# Patient Record
Sex: MALE | Race: WHITE | NOT HISPANIC OR LATINO | Employment: FULL TIME | URBAN - METROPOLITAN AREA
[De-identification: names, ages, dates, MRNs, and addresses within clinical notes are randomized per-mention and may not be internally consistent; named-entity substitution may affect disease eponyms.]

---

## 2017-01-04 ENCOUNTER — TELEPHONE (OUTPATIENT)
Dept: INTERNAL MEDICINE | Age: 47
End: 2017-01-04

## 2017-01-26 ENCOUNTER — TELEPHONE (OUTPATIENT)
Dept: INTERNAL MEDICINE | Age: 47
End: 2017-01-26

## 2017-01-26 DIAGNOSIS — F98.8 ADD (ATTENTION DEFICIT DISORDER): ICD-10-CM

## 2017-01-26 RX ORDER — DEXTROAMPHETAMINE SACCHARATE, AMPHETAMINE ASPARTATE MONOHYDRATE, DEXTROAMPHETAMINE SULFATE AND AMPHETAMINE SULFATE 5; 5; 5; 5 MG/1; MG/1; MG/1; MG/1
20 CAPSULE, EXTENDED RELEASE ORAL DAILY
Qty: 30 CAPSULE | Refills: 0 | Status: SHIPPED | OUTPATIENT
Start: 2017-02-27 | End: 2017-03-27 | Stop reason: SDUPTHER

## 2017-01-26 RX ORDER — DEXTROAMPHETAMINE SACCHARATE, AMPHETAMINE ASPARTATE MONOHYDRATE, DEXTROAMPHETAMINE SULFATE AND AMPHETAMINE SULFATE 5; 5; 5; 5 MG/1; MG/1; MG/1; MG/1
20 CAPSULE, EXTENDED RELEASE ORAL DAILY
Qty: 30 CAPSULE | Refills: 0 | Status: SHIPPED | OUTPATIENT
Start: 2017-01-28 | End: 2017-03-27 | Stop reason: SDUPTHER

## 2017-03-27 ENCOUNTER — TELEPHONE (OUTPATIENT)
Dept: INTERNAL MEDICINE | Age: 47
End: 2017-03-27

## 2017-03-27 DIAGNOSIS — F98.8 ADD (ATTENTION DEFICIT DISORDER): ICD-10-CM

## 2017-03-27 RX ORDER — DEXTROAMPHETAMINE SACCHARATE, AMPHETAMINE ASPARTATE MONOHYDRATE, DEXTROAMPHETAMINE SULFATE AND AMPHETAMINE SULFATE 5; 5; 5; 5 MG/1; MG/1; MG/1; MG/1
20 CAPSULE, EXTENDED RELEASE ORAL DAILY
Qty: 30 CAPSULE | Refills: 0 | Status: SHIPPED | OUTPATIENT
Start: 2017-04-28 | End: 2017-06-23 | Stop reason: SDUPTHER

## 2017-03-27 RX ORDER — DEXTROAMPHETAMINE SACCHARATE, AMPHETAMINE ASPARTATE MONOHYDRATE, DEXTROAMPHETAMINE SULFATE AND AMPHETAMINE SULFATE 5; 5; 5; 5 MG/1; MG/1; MG/1; MG/1
20 CAPSULE, EXTENDED RELEASE ORAL DAILY
Qty: 30 CAPSULE | Refills: 0 | Status: SHIPPED | OUTPATIENT
Start: 2017-03-29 | End: 2017-05-13 | Stop reason: SDUPTHER

## 2017-05-10 ENCOUNTER — TELEPHONE (OUTPATIENT)
Dept: INTERNAL MEDICINE | Age: 47
End: 2017-05-10

## 2017-05-10 DIAGNOSIS — Z51.81 MEDICATION MONITORING ENCOUNTER: Primary | ICD-10-CM

## 2017-05-10 DIAGNOSIS — Z13.220 SCREENING FOR LIPOID DISORDERS: ICD-10-CM

## 2017-05-11 ENCOUNTER — LAB SERVICES (OUTPATIENT)
Dept: LAB | Age: 47
End: 2017-05-11

## 2017-05-11 DIAGNOSIS — Z13.220 SCREENING FOR LIPOID DISORDERS: ICD-10-CM

## 2017-05-11 DIAGNOSIS — Z51.81 MEDICATION MONITORING ENCOUNTER: ICD-10-CM

## 2017-05-11 LAB
ALBUMIN SERPL-MCNC: 4.2 G/DL (ref 3.6–5.1)
ALBUMIN/GLOB SERPL: 1.4 {RATIO} (ref 1–2.4)
ALP SERPL-CCNC: 91 UNITS/L (ref 45–117)
ALT SERPL-CCNC: 66 UNITS/L
ANION GAP SERPL CALC-SCNC: 11 MMOL/L (ref 10–20)
AST SERPL-CCNC: 23 UNITS/L
BILIRUB SERPL-MCNC: 0.7 MG/DL (ref 0.2–1)
BUN SERPL-MCNC: 12 MG/DL (ref 6–20)
BUN/CREAT SERPL: 15 (ref 7–25)
CALCIUM SERPL-MCNC: 9.4 MG/DL (ref 8.4–10.2)
CHLORIDE SERPL-SCNC: 105 MMOL/L (ref 98–107)
CHOLEST SERPL-MCNC: 194 MG/DL
CHOLEST/HDLC SERPL: 2.8 {RATIO}
CO2 SERPL-SCNC: 29 MMOL/L (ref 21–32)
CREAT SERPL-MCNC: 0.83 MG/DL (ref 0.67–1.17)
FASTING STATUS PATIENT QL REPORTED: 12 HRS
GLOBULIN SER-MCNC: 2.9 G/DL (ref 2–4)
GLUCOSE SERPL-MCNC: 86 MG/DL (ref 65–99)
HDLC SERPL-MCNC: 70 MG/DL
LDLC SERPL-MCNC: 111 MG/DL
LENGTH OF FAST TIME PATIENT: 12 HRS
NONHDLC SERPL-MCNC: 124 MG/DL
POTASSIUM SERPL-SCNC: 4.4 MMOL/L (ref 3.4–5.1)
PROT SERPL-MCNC: 7.1 G/DL (ref 6.4–8.2)
SODIUM SERPL-SCNC: 141 MMOL/L (ref 135–145)
TRIGL SERPL-MCNC: 65 MG/DL

## 2017-05-11 PROCEDURE — 36415 COLL VENOUS BLD VENIPUNCTURE: CPT | Performed by: INTERNAL MEDICINE

## 2017-05-11 PROCEDURE — 80061 LIPID PANEL: CPT | Performed by: INTERNAL MEDICINE

## 2017-05-11 PROCEDURE — 80053 COMPREHEN METABOLIC PANEL: CPT | Performed by: INTERNAL MEDICINE

## 2017-05-13 ENCOUNTER — OFFICE VISIT (OUTPATIENT)
Dept: INTERNAL MEDICINE | Age: 47
End: 2017-05-13

## 2017-05-13 VITALS
WEIGHT: 236.4 LBS | TEMPERATURE: 98.6 F | RESPIRATION RATE: 12 BRPM | SYSTOLIC BLOOD PRESSURE: 108 MMHG | BODY MASS INDEX: 33.1 KG/M2 | HEART RATE: 64 BPM | HEIGHT: 71 IN | DIASTOLIC BLOOD PRESSURE: 76 MMHG

## 2017-05-13 DIAGNOSIS — Z80.8 FAMILY HISTORY OF SKIN CANCER: ICD-10-CM

## 2017-05-13 DIAGNOSIS — Z00.00 ROUTINE GENERAL MEDICAL EXAMINATION AT A HEALTH CARE FACILITY: Primary | ICD-10-CM

## 2017-05-13 DIAGNOSIS — F98.8 ADD (ATTENTION DEFICIT DISORDER): ICD-10-CM

## 2017-05-13 PROCEDURE — 99396 PREV VISIT EST AGE 40-64: CPT | Performed by: INTERNAL MEDICINE

## 2017-05-13 RX ORDER — DEXTROAMPHETAMINE SACCHARATE, AMPHETAMINE ASPARTATE MONOHYDRATE, DEXTROAMPHETAMINE SULFATE AND AMPHETAMINE SULFATE 5; 5; 5; 5 MG/1; MG/1; MG/1; MG/1
20 CAPSULE, EXTENDED RELEASE ORAL DAILY
Qty: 30 CAPSULE | Refills: 0 | Status: SHIPPED | OUTPATIENT
Start: 2017-05-28 | End: 2017-06-23 | Stop reason: SDUPTHER

## 2017-06-23 ENCOUNTER — TELEPHONE (OUTPATIENT)
Dept: INTERNAL MEDICINE | Age: 47
End: 2017-06-23

## 2017-06-23 DIAGNOSIS — F98.8 ADD (ATTENTION DEFICIT DISORDER): ICD-10-CM

## 2017-06-23 RX ORDER — DEXTROAMPHETAMINE SACCHARATE, AMPHETAMINE ASPARTATE MONOHYDRATE, DEXTROAMPHETAMINE SULFATE AND AMPHETAMINE SULFATE 5; 5; 5; 5 MG/1; MG/1; MG/1; MG/1
20 CAPSULE, EXTENDED RELEASE ORAL DAILY
Qty: 30 CAPSULE | Refills: 0 | Status: SHIPPED | OUTPATIENT
Start: 2017-07-27 | End: 2017-08-23 | Stop reason: SDUPTHER

## 2017-06-23 RX ORDER — DEXTROAMPHETAMINE SACCHARATE, AMPHETAMINE ASPARTATE MONOHYDRATE, DEXTROAMPHETAMINE SULFATE AND AMPHETAMINE SULFATE 5; 5; 5; 5 MG/1; MG/1; MG/1; MG/1
20 CAPSULE, EXTENDED RELEASE ORAL DAILY
Qty: 30 CAPSULE | Refills: 0 | Status: SHIPPED | OUTPATIENT
Start: 2017-06-27 | End: 2017-08-23 | Stop reason: SDUPTHER

## 2017-08-23 ENCOUNTER — TELEPHONE (OUTPATIENT)
Dept: INTERNAL MEDICINE | Age: 47
End: 2017-08-23

## 2017-08-23 DIAGNOSIS — F98.8 ADD (ATTENTION DEFICIT DISORDER): ICD-10-CM

## 2017-08-23 RX ORDER — DEXTROAMPHETAMINE SACCHARATE, AMPHETAMINE ASPARTATE MONOHYDRATE, DEXTROAMPHETAMINE SULFATE AND AMPHETAMINE SULFATE 5; 5; 5; 5 MG/1; MG/1; MG/1; MG/1
20 CAPSULE, EXTENDED RELEASE ORAL DAILY
Qty: 30 CAPSULE | Refills: 0 | Status: SHIPPED | OUTPATIENT
Start: 2017-09-25 | End: 2017-10-24

## 2017-08-23 RX ORDER — DEXTROAMPHETAMINE SACCHARATE, AMPHETAMINE ASPARTATE MONOHYDRATE, DEXTROAMPHETAMINE SULFATE AND AMPHETAMINE SULFATE 5; 5; 5; 5 MG/1; MG/1; MG/1; MG/1
20 CAPSULE, EXTENDED RELEASE ORAL DAILY
Qty: 30 CAPSULE | Refills: 0 | Status: SHIPPED | OUTPATIENT
Start: 2017-08-26 | End: 2017-10-26 | Stop reason: SDUPTHER

## 2017-10-11 ENCOUNTER — OFFICE VISIT (OUTPATIENT)
Dept: DERMATOLOGY | Age: 47
End: 2017-10-11
Attending: INTERNAL MEDICINE

## 2017-10-11 DIAGNOSIS — D18.01 CHERRY ANGIOMA: ICD-10-CM

## 2017-10-11 DIAGNOSIS — L57.8 SUN-DAMAGED SKIN: ICD-10-CM

## 2017-10-11 DIAGNOSIS — L30.9 ECZEMA, UNSPECIFIED TYPE: ICD-10-CM

## 2017-10-11 DIAGNOSIS — D22.9 MULTIPLE BENIGN NEVI: Primary | ICD-10-CM

## 2017-10-11 PROCEDURE — 99203 OFFICE O/P NEW LOW 30 MIN: CPT | Performed by: DERMATOLOGY

## 2017-10-25 DIAGNOSIS — F98.8 ATTENTION DEFICIT DISORDER (ADD) WITHOUT HYPERACTIVITY: ICD-10-CM

## 2017-10-27 ENCOUNTER — TELEPHONE (OUTPATIENT)
Dept: INTERNAL MEDICINE | Age: 47
End: 2017-10-27

## 2017-10-27 RX ORDER — DEXTROAMPHETAMINE SACCHARATE, AMPHETAMINE ASPARTATE MONOHYDRATE, DEXTROAMPHETAMINE SULFATE AND AMPHETAMINE SULFATE 5; 5; 5; 5 MG/1; MG/1; MG/1; MG/1
20 CAPSULE, EXTENDED RELEASE ORAL DAILY
Qty: 30 CAPSULE | Refills: 0 | Status: SHIPPED | OUTPATIENT
Start: 2017-10-27 | End: 2017-11-09 | Stop reason: SDUPTHER

## 2017-11-09 ENCOUNTER — OFFICE VISIT (OUTPATIENT)
Dept: INTERNAL MEDICINE | Age: 47
End: 2017-11-09

## 2017-11-09 VITALS
WEIGHT: 227.5 LBS | BODY MASS INDEX: 31.85 KG/M2 | DIASTOLIC BLOOD PRESSURE: 82 MMHG | HEIGHT: 71 IN | HEART RATE: 76 BPM | RESPIRATION RATE: 12 BRPM | TEMPERATURE: 98.4 F | SYSTOLIC BLOOD PRESSURE: 118 MMHG

## 2017-11-09 DIAGNOSIS — F98.8 ATTENTION DEFICIT DISORDER (ADD) WITHOUT HYPERACTIVITY: ICD-10-CM

## 2017-11-09 PROCEDURE — 99214 OFFICE O/P EST MOD 30 MIN: CPT | Performed by: INTERNAL MEDICINE

## 2017-11-09 RX ORDER — DEXTROAMPHETAMINE SACCHARATE, AMPHETAMINE ASPARTATE MONOHYDRATE, DEXTROAMPHETAMINE SULFATE AND AMPHETAMINE SULFATE 7.5; 7.5; 7.5; 7.5 MG/1; MG/1; MG/1; MG/1
30 CAPSULE, EXTENDED RELEASE ORAL DAILY
Qty: 30 CAPSULE | Refills: 0 | Status: SHIPPED | OUTPATIENT
Start: 2017-11-09 | End: 2017-11-09 | Stop reason: SDUPTHER

## 2017-11-09 RX ORDER — DEXTROAMPHETAMINE SACCHARATE, AMPHETAMINE ASPARTATE MONOHYDRATE, DEXTROAMPHETAMINE SULFATE AND AMPHETAMINE SULFATE 7.5; 7.5; 7.5; 7.5 MG/1; MG/1; MG/1; MG/1
30 CAPSULE, EXTENDED RELEASE ORAL DAILY
Qty: 30 CAPSULE | Refills: 0 | Status: SHIPPED | OUTPATIENT
Start: 2017-12-09 | End: 2018-01-17 | Stop reason: SDUPTHER

## 2017-11-09 ASSESSMENT — PATIENT HEALTH QUESTIONNAIRE - PHQ9
CLINICAL INTERPRETATION OF PHQ2 SCORE: 0
SUM OF ALL RESPONSES TO PHQ9 QUESTIONS 1 AND 2: 0

## 2018-01-16 ENCOUNTER — TELEPHONE (OUTPATIENT)
Dept: INTERNAL MEDICINE | Age: 48
End: 2018-01-16

## 2018-01-16 DIAGNOSIS — F98.8 ATTENTION DEFICIT DISORDER (ADD) WITHOUT HYPERACTIVITY: ICD-10-CM

## 2018-01-17 RX ORDER — DEXTROAMPHETAMINE SACCHARATE, AMPHETAMINE ASPARTATE MONOHYDRATE, DEXTROAMPHETAMINE SULFATE AND AMPHETAMINE SULFATE 7.5; 7.5; 7.5; 7.5 MG/1; MG/1; MG/1; MG/1
30 CAPSULE, EXTENDED RELEASE ORAL DAILY
Qty: 30 CAPSULE | Refills: 0 | Status: SHIPPED | OUTPATIENT
Start: 2018-01-17 | End: 2018-02-22 | Stop reason: SDUPTHER

## 2018-02-22 ENCOUNTER — TELEPHONE (OUTPATIENT)
Dept: INTERNAL MEDICINE | Age: 48
End: 2018-02-22

## 2018-02-22 DIAGNOSIS — F98.8 ATTENTION DEFICIT DISORDER (ADD) WITHOUT HYPERACTIVITY: ICD-10-CM

## 2018-02-22 RX ORDER — DEXTROAMPHETAMINE SACCHARATE, AMPHETAMINE ASPARTATE MONOHYDRATE, DEXTROAMPHETAMINE SULFATE AND AMPHETAMINE SULFATE 7.5; 7.5; 7.5; 7.5 MG/1; MG/1; MG/1; MG/1
30 CAPSULE, EXTENDED RELEASE ORAL DAILY
Qty: 30 CAPSULE | Refills: 0 | Status: SHIPPED | OUTPATIENT
Start: 2018-02-22 | End: 2018-03-21 | Stop reason: SDUPTHER

## 2018-03-21 ENCOUNTER — TELEPHONE (OUTPATIENT)
Dept: INTERNAL MEDICINE | Age: 48
End: 2018-03-21

## 2018-03-21 DIAGNOSIS — F98.8 ATTENTION DEFICIT DISORDER (ADD) WITHOUT HYPERACTIVITY: ICD-10-CM

## 2018-03-21 RX ORDER — DEXTROAMPHETAMINE SACCHARATE, AMPHETAMINE ASPARTATE MONOHYDRATE, DEXTROAMPHETAMINE SULFATE AND AMPHETAMINE SULFATE 7.5; 7.5; 7.5; 7.5 MG/1; MG/1; MG/1; MG/1
30 CAPSULE, EXTENDED RELEASE ORAL DAILY
Qty: 30 CAPSULE | Refills: 0 | Status: SHIPPED | OUTPATIENT
Start: 2018-03-24 | End: 2018-04-23 | Stop reason: SDUPTHER

## 2018-04-23 ENCOUNTER — TELEPHONE (OUTPATIENT)
Dept: INTERNAL MEDICINE | Age: 48
End: 2018-04-23

## 2018-04-23 DIAGNOSIS — F98.8 ATTENTION DEFICIT DISORDER (ADD) WITHOUT HYPERACTIVITY: ICD-10-CM

## 2018-04-23 RX ORDER — DEXTROAMPHETAMINE SACCHARATE, AMPHETAMINE ASPARTATE MONOHYDRATE, DEXTROAMPHETAMINE SULFATE AND AMPHETAMINE SULFATE 7.5; 7.5; 7.5; 7.5 MG/1; MG/1; MG/1; MG/1
30 CAPSULE, EXTENDED RELEASE ORAL DAILY
Qty: 30 CAPSULE | Refills: 0 | Status: SHIPPED | OUTPATIENT
Start: 2018-04-23 | End: 2018-05-24 | Stop reason: SDUPTHER

## 2018-05-14 ENCOUNTER — APPOINTMENT (OUTPATIENT)
Dept: INTERNAL MEDICINE | Age: 48
End: 2018-05-14

## 2018-05-24 ENCOUNTER — TELEPHONE (OUTPATIENT)
Dept: INTERNAL MEDICINE | Age: 48
End: 2018-05-24

## 2018-05-24 DIAGNOSIS — F98.8 ATTENTION DEFICIT DISORDER (ADD) WITHOUT HYPERACTIVITY: ICD-10-CM

## 2018-05-24 RX ORDER — DEXTROAMPHETAMINE SACCHARATE, AMPHETAMINE ASPARTATE MONOHYDRATE, DEXTROAMPHETAMINE SULFATE AND AMPHETAMINE SULFATE 7.5; 7.5; 7.5; 7.5 MG/1; MG/1; MG/1; MG/1
30 CAPSULE, EXTENDED RELEASE ORAL DAILY
Qty: 30 CAPSULE | Refills: 0 | Status: SHIPPED | OUTPATIENT
Start: 2018-05-25 | End: 2018-06-21 | Stop reason: SDUPTHER

## 2018-06-21 ENCOUNTER — TELEPHONE (OUTPATIENT)
Dept: INTERNAL MEDICINE | Age: 48
End: 2018-06-21

## 2018-06-21 DIAGNOSIS — F98.8 ATTENTION DEFICIT DISORDER (ADD) WITHOUT HYPERACTIVITY: ICD-10-CM

## 2018-06-21 RX ORDER — DEXTROAMPHETAMINE SACCHARATE, AMPHETAMINE ASPARTATE MONOHYDRATE, DEXTROAMPHETAMINE SULFATE AND AMPHETAMINE SULFATE 7.5; 7.5; 7.5; 7.5 MG/1; MG/1; MG/1; MG/1
30 CAPSULE, EXTENDED RELEASE ORAL DAILY
Qty: 30 CAPSULE | Refills: 0 | Status: SHIPPED | OUTPATIENT
Start: 2018-06-24 | End: 2018-07-25 | Stop reason: SDUPTHER

## 2018-06-25 ENCOUNTER — TELEPHONE (OUTPATIENT)
Dept: INTERNAL MEDICINE | Age: 48
End: 2018-06-25

## 2018-07-13 ENCOUNTER — OFFICE VISIT (OUTPATIENT)
Dept: INTERNAL MEDICINE | Age: 48
End: 2018-07-13

## 2018-07-13 ENCOUNTER — APPOINTMENT (OUTPATIENT)
Dept: INTERNAL MEDICINE | Age: 48
End: 2018-07-13

## 2018-07-13 VITALS
WEIGHT: 241 LBS | TEMPERATURE: 99.6 F | HEART RATE: 89 BPM | BODY MASS INDEX: 33.74 KG/M2 | RESPIRATION RATE: 16 BRPM | DIASTOLIC BLOOD PRESSURE: 80 MMHG | SYSTOLIC BLOOD PRESSURE: 118 MMHG | HEIGHT: 71 IN

## 2018-07-13 DIAGNOSIS — Z51.81 MEDICATION MONITORING ENCOUNTER: ICD-10-CM

## 2018-07-13 DIAGNOSIS — F98.8 ATTENTION DEFICIT DISORDER (ADD) WITHOUT HYPERACTIVITY: Primary | ICD-10-CM

## 2018-07-13 DIAGNOSIS — Z13.220 SCREENING FOR LIPOID DISORDERS: ICD-10-CM

## 2018-07-13 DIAGNOSIS — Z12.5 SPECIAL SCREENING FOR MALIGNANT NEOPLASM OF PROSTATE: ICD-10-CM

## 2018-07-13 PROCEDURE — 99214 OFFICE O/P EST MOD 30 MIN: CPT | Performed by: INTERNAL MEDICINE

## 2018-07-24 ENCOUNTER — TELEPHONE (OUTPATIENT)
Dept: INTERNAL MEDICINE | Age: 48
End: 2018-07-24

## 2018-07-24 DIAGNOSIS — F98.8 ATTENTION DEFICIT DISORDER (ADD) WITHOUT HYPERACTIVITY: ICD-10-CM

## 2018-07-25 RX ORDER — DEXTROAMPHETAMINE SACCHARATE, AMPHETAMINE ASPARTATE MONOHYDRATE, DEXTROAMPHETAMINE SULFATE AND AMPHETAMINE SULFATE 7.5; 7.5; 7.5; 7.5 MG/1; MG/1; MG/1; MG/1
30 CAPSULE, EXTENDED RELEASE ORAL DAILY
Qty: 30 CAPSULE | Refills: 0 | Status: SHIPPED | OUTPATIENT
Start: 2018-07-25 | End: 2018-08-27 | Stop reason: SDUPTHER

## 2018-08-24 ENCOUNTER — TELEPHONE (OUTPATIENT)
Dept: ORTHOPEDICS | Age: 48
End: 2018-08-24

## 2018-08-24 DIAGNOSIS — F98.8 ATTENTION DEFICIT DISORDER (ADD) WITHOUT HYPERACTIVITY: ICD-10-CM

## 2018-08-27 RX ORDER — DEXTROAMPHETAMINE SACCHARATE, AMPHETAMINE ASPARTATE MONOHYDRATE, DEXTROAMPHETAMINE SULFATE AND AMPHETAMINE SULFATE 7.5; 7.5; 7.5; 7.5 MG/1; MG/1; MG/1; MG/1
30 CAPSULE, EXTENDED RELEASE ORAL DAILY
Qty: 30 CAPSULE | Refills: 0 | Status: SHIPPED | OUTPATIENT
Start: 2018-08-27 | End: 2018-09-25 | Stop reason: SDUPTHER

## 2018-08-27 RX ORDER — DEXTROAMPHETAMINE SACCHARATE, AMPHETAMINE ASPARTATE MONOHYDRATE, DEXTROAMPHETAMINE SULFATE AND AMPHETAMINE SULFATE 7.5; 7.5; 7.5; 7.5 MG/1; MG/1; MG/1; MG/1
30 CAPSULE, EXTENDED RELEASE ORAL DAILY
Qty: 30 CAPSULE | Refills: 0 | Status: SHIPPED | OUTPATIENT
Start: 2018-08-27 | End: 2018-08-27 | Stop reason: SDUPTHER

## 2018-09-25 ENCOUNTER — TELEPHONE (OUTPATIENT)
Dept: INTERNAL MEDICINE | Age: 48
End: 2018-09-25

## 2018-09-25 DIAGNOSIS — F98.8 ATTENTION DEFICIT DISORDER (ADD) WITHOUT HYPERACTIVITY: ICD-10-CM

## 2018-09-25 RX ORDER — DEXTROAMPHETAMINE SACCHARATE, AMPHETAMINE ASPARTATE MONOHYDRATE, DEXTROAMPHETAMINE SULFATE AND AMPHETAMINE SULFATE 7.5; 7.5; 7.5; 7.5 MG/1; MG/1; MG/1; MG/1
30 CAPSULE, EXTENDED RELEASE ORAL DAILY
Qty: 30 CAPSULE | Refills: 0 | Status: SHIPPED | OUTPATIENT
Start: 2018-09-26 | End: 2018-10-26 | Stop reason: SDUPTHER

## 2018-10-26 ENCOUNTER — TELEPHONE (OUTPATIENT)
Dept: INTERNAL MEDICINE | Age: 48
End: 2018-10-26

## 2018-10-26 DIAGNOSIS — F98.8 ATTENTION DEFICIT DISORDER (ADD) WITHOUT HYPERACTIVITY: ICD-10-CM

## 2018-10-26 RX ORDER — DEXTROAMPHETAMINE SACCHARATE, AMPHETAMINE ASPARTATE MONOHYDRATE, DEXTROAMPHETAMINE SULFATE AND AMPHETAMINE SULFATE 7.5; 7.5; 7.5; 7.5 MG/1; MG/1; MG/1; MG/1
30 CAPSULE, EXTENDED RELEASE ORAL DAILY
Qty: 30 CAPSULE | Refills: 0 | Status: SHIPPED | OUTPATIENT
Start: 2018-10-26 | End: 2018-11-23 | Stop reason: SDUPTHER

## 2018-11-23 ENCOUNTER — TELEPHONE (OUTPATIENT)
Dept: INTERNAL MEDICINE | Age: 48
End: 2018-11-23

## 2018-11-23 DIAGNOSIS — F98.8 ATTENTION DEFICIT DISORDER (ADD) WITHOUT HYPERACTIVITY: ICD-10-CM

## 2018-11-23 RX ORDER — DEXTROAMPHETAMINE SACCHARATE, AMPHETAMINE ASPARTATE MONOHYDRATE, DEXTROAMPHETAMINE SULFATE AND AMPHETAMINE SULFATE 7.5; 7.5; 7.5; 7.5 MG/1; MG/1; MG/1; MG/1
30 CAPSULE, EXTENDED RELEASE ORAL DAILY
Qty: 30 CAPSULE | Refills: 0 | Status: SHIPPED | OUTPATIENT
Start: 2018-11-23 | End: 2018-12-20 | Stop reason: SDUPTHER

## 2018-12-20 ENCOUNTER — TELEPHONE (OUTPATIENT)
Dept: INTERNAL MEDICINE | Age: 48
End: 2018-12-20

## 2018-12-20 DIAGNOSIS — F98.8 ATTENTION DEFICIT DISORDER (ADD) WITHOUT HYPERACTIVITY: ICD-10-CM

## 2018-12-20 RX ORDER — DEXTROAMPHETAMINE SACCHARATE, AMPHETAMINE ASPARTATE MONOHYDRATE, DEXTROAMPHETAMINE SULFATE AND AMPHETAMINE SULFATE 7.5; 7.5; 7.5; 7.5 MG/1; MG/1; MG/1; MG/1
30 CAPSULE, EXTENDED RELEASE ORAL DAILY
Qty: 30 CAPSULE | Refills: 0 | Status: SHIPPED | OUTPATIENT
Start: 2018-12-23 | End: 2019-01-17 | Stop reason: SDUPTHER

## 2019-01-08 ENCOUNTER — CLINICAL SUPPORT NO REQUIREMENTS (OUTPATIENT)
Dept: CARDIOLOGY | Facility: CLINIC | Age: 49
End: 2019-01-08

## 2019-01-08 ENCOUNTER — OFFICE VISIT (OUTPATIENT)
Dept: CARDIOLOGY | Facility: CLINIC | Age: 49
End: 2019-01-08

## 2019-01-08 VITALS
HEIGHT: 70 IN | SYSTOLIC BLOOD PRESSURE: 108 MMHG | BODY MASS INDEX: 32.64 KG/M2 | WEIGHT: 228 LBS | DIASTOLIC BLOOD PRESSURE: 74 MMHG

## 2019-01-08 DIAGNOSIS — I49.5 SICK SINUS SYNDROME (HCC): Primary | ICD-10-CM

## 2019-01-08 DIAGNOSIS — Z45.018 PACEMAKER REPROGRAMMING/CHECK: Primary | ICD-10-CM

## 2019-01-08 PROCEDURE — 99202 OFFICE O/P NEW SF 15 MIN: CPT | Performed by: INTERNAL MEDICINE

## 2019-01-08 PROCEDURE — 93000 ELECTROCARDIOGRAM COMPLETE: CPT | Performed by: INTERNAL MEDICINE

## 2019-01-08 PROCEDURE — 93280 PM DEVICE PROGR EVAL DUAL: CPT | Performed by: INTERNAL MEDICINE

## 2019-01-08 RX ORDER — IBUPROFEN 200 MG
TABLET ORAL
COMMUNITY
End: 2023-01-03

## 2019-01-08 NOTE — PROGRESS NOTES
Date of Office Visit: 2019  Encounter Provider: Freedom Meeks MD  Place of Service: [facility] CARDIOLOGY  Patient Name: Kingston Hope  :1970    Chief Complaint   Patient presents with   • Pacemaker Check     New Patient Consult / Palps   :     HPI: Kingston Hope is a 48 y.o. male who presents today for establish pacemaker follow.  Mr. Hope had a pacemaker placed at age 28, in .  The indication for placement was recurrent syncope.  He was told at one time that he had heart block secondary to Lyme disease, but latter testing according to him demonstrated that he had never had Lyme Disease.  Otherwise, he has done well since, with no recurrent syncope.  He had a generator exchange in  in [place].  He has had inconsistent follow-up for his device.   He has no concerns about his pacemaker.  He is not currently enrolled in remote monitoring, but is going to start when he is moved into a new house.           Past Medical History:   Diagnosis Date   • Anxiety    • Exertional chest pain     with radiation to left arm.   • History of non-A, non-B viral hepatitis     during childhood.   • History of Trevor [place] spotted fever     as a child   • Pacemaker     secondary to intermittent heart block/ asystole.   • Palpitations    • Syncope        Past Surgical History:   Procedure Laterality Date   • OTHER SURGICAL HISTORY      left femur fracture   • PACEMAKER IMPLANTATION  10/12/2010    medtronic  model number [id number], serial number [id number]   • TEMPOROMANDIBULAR JOINT SURGERY         Social History     Socioeconomic History   • Marital status:      Spouse name: Not on file   • Number of children: Not on file   • Years of education: Not on file   • Highest education level: Not on file   Social Needs   • Financial resource strain: Not on file   • Food insecurity - worry: Not on file   • Food insecurity - inability: Not on file   • Transportation  "needs - medical: Not on file   • Transportation needs - non-medical: Not on file   Occupational History   • Not on file   Tobacco Use   • Smoking status: Never Smoker   • Smokeless tobacco: Never Used   Substance and Sexual Activity   • Alcohol use: No     Frequency: Never   • Drug use: Not on file   • Sexual activity: Not on file   Other Topics Concern   • Not on file   Social History Narrative   • Not on file       History reviewed. No pertinent family history.    Review of Systems   Constitution: Negative for weakness and malaise/fatigue.   HENT: Negative.    Eyes: Negative.    Cardiovascular: Negative for chest pain, dyspnea on exertion, leg swelling and near-syncope.   Respiratory: Negative for cough and shortness of breath.    Endocrine: Negative.    Hematologic/Lymphatic: Negative.    Skin: Negative.    Musculoskeletal: Negative.    Gastrointestinal: Negative.    Genitourinary: Negative.    Neurological: Negative.    Psychiatric/Behavioral: Negative.    Allergic/Immunologic: Negative.        No Known Allergies      Current Outpatient Medications:   •  ibuprofen (ADVIL,MOTRIN) 200 MG tablet, Take  by mouth., Disp: , Rfl:       Objective:     Vitals:    01/08/19 1105   BP: 108/74   BP Location: Left arm   Patient Position: Sitting   Cuff Size: Large Adult   Weight: 103 kg (228 lb)   Height: 177.8 cm (70\")     Body mass index is 32.71 kg/m².    PHYSICAL EXAM:    Physical Exam   Constitutional: He is oriented to person, place, and time. He appears well-developed.   HENT:   Head: Normocephalic and atraumatic.   Eyes: Conjunctivae are normal. Right eye exhibits no discharge. Left eye exhibits no discharge.   Neck: No JVD present. No tracheal deviation present.   Cardiovascular: Normal rate and normal heart sounds.   Pulmonary/Chest: Effort normal and breath sounds normal. No respiratory distress.   Pacemaker in place on left chest.  Well healed. No evidence of infection.   Abdominal: Soft. He exhibits no " distension.   Musculoskeletal: He exhibits no edema or deformity.   Neurological: He is alert and oriented to person, place, and time. No cranial nerve deficit.   Skin: Skin is warm and dry.   Psychiatric: He has a normal mood and affect. His behavior is normal.   Nursing note and vitals reviewed.          ECG 12 Lead  Date/Time: 1/8/2019 4:54 PM  Performed by: Freedom Meeks MD  Authorized by: Freedom Meeks MD   Comparison: not compared with previous ECG   Rhythm: sinus rhythm  Clinical impression: normal ECG              Assessment:       Diagnosis Plan   1. Pacemaker reprogramming/check            Plan:       Normal function of device with good sensing and thresholds.  Unclear was initial indication was, possibly recurrent vasovagal syncope.  Regardless, no further episodes of syncope since implantation.  I don't think he had Lyme disease base on his description.  He will resume normal pacemaker follow and remote monitoring.     As always, it has been a pleasure to participate in your patient's care.      Sincerely,         Freedom Meeks MD

## 2019-01-17 ENCOUNTER — OFFICE VISIT (OUTPATIENT)
Dept: INTERNAL MEDICINE | Age: 49
End: 2019-01-17

## 2019-01-17 VITALS
WEIGHT: 246.8 LBS | SYSTOLIC BLOOD PRESSURE: 120 MMHG | HEART RATE: 72 BPM | RESPIRATION RATE: 16 BRPM | HEIGHT: 71 IN | TEMPERATURE: 98.6 F | BODY MASS INDEX: 34.55 KG/M2 | DIASTOLIC BLOOD PRESSURE: 82 MMHG

## 2019-01-17 DIAGNOSIS — F98.8 ATTENTION DEFICIT DISORDER (ADD) WITHOUT HYPERACTIVITY: ICD-10-CM

## 2019-01-17 PROCEDURE — 99213 OFFICE O/P EST LOW 20 MIN: CPT | Performed by: INTERNAL MEDICINE

## 2019-01-17 RX ORDER — DEXTROAMPHETAMINE SACCHARATE, AMPHETAMINE ASPARTATE MONOHYDRATE, DEXTROAMPHETAMINE SULFATE AND AMPHETAMINE SULFATE 5; 5; 5; 5 MG/1; MG/1; MG/1; MG/1
20 CAPSULE, EXTENDED RELEASE ORAL DAILY
Qty: 30 CAPSULE | Refills: 0 | Status: SHIPPED | OUTPATIENT
Start: 2019-01-22 | End: 2019-02-19 | Stop reason: SDUPTHER

## 2019-01-17 ASSESSMENT — PATIENT HEALTH QUESTIONNAIRE - PHQ9
SUM OF ALL RESPONSES TO PHQ9 QUESTIONS 1 AND 2: 0
CLINICAL INTERPRETATION OF PHQ2 SCORE: 0

## 2019-01-18 ENCOUNTER — APPOINTMENT (OUTPATIENT)
Dept: INTERNAL MEDICINE | Age: 49
End: 2019-01-18

## 2019-02-18 ENCOUNTER — TELEPHONE (OUTPATIENT)
Dept: INTERNAL MEDICINE | Age: 49
End: 2019-02-18

## 2019-02-18 DIAGNOSIS — F98.8 ATTENTION DEFICIT DISORDER (ADD) WITHOUT HYPERACTIVITY: ICD-10-CM

## 2019-02-19 RX ORDER — DEXTROAMPHETAMINE SACCHARATE, AMPHETAMINE ASPARTATE MONOHYDRATE, DEXTROAMPHETAMINE SULFATE AND AMPHETAMINE SULFATE 5; 5; 5; 5 MG/1; MG/1; MG/1; MG/1
20 CAPSULE, EXTENDED RELEASE ORAL DAILY
Qty: 30 CAPSULE | Refills: 0 | Status: SHIPPED | OUTPATIENT
Start: 2019-02-21 | End: 2019-03-20 | Stop reason: SDUPTHER

## 2019-03-09 ENCOUNTER — APPOINTMENT (OUTPATIENT)
Dept: INTERNAL MEDICINE | Age: 49
End: 2019-03-09

## 2019-03-20 ENCOUNTER — TELEPHONE (OUTPATIENT)
Dept: INTERNAL MEDICINE | Age: 49
End: 2019-03-20

## 2019-03-20 DIAGNOSIS — F98.8 ATTENTION DEFICIT DISORDER (ADD) WITHOUT HYPERACTIVITY: ICD-10-CM

## 2019-03-20 RX ORDER — DEXTROAMPHETAMINE SACCHARATE, AMPHETAMINE ASPARTATE MONOHYDRATE, DEXTROAMPHETAMINE SULFATE AND AMPHETAMINE SULFATE 5; 5; 5; 5 MG/1; MG/1; MG/1; MG/1
20 CAPSULE, EXTENDED RELEASE ORAL DAILY
Qty: 30 CAPSULE | Refills: 0 | Status: SHIPPED | OUTPATIENT
Start: 2019-03-23 | End: 2019-04-22 | Stop reason: SDUPTHER

## 2019-04-18 ENCOUNTER — TELEPHONE (OUTPATIENT)
Dept: INTERNAL MEDICINE | Age: 49
End: 2019-04-18

## 2019-04-18 DIAGNOSIS — F98.8 ATTENTION DEFICIT DISORDER (ADD) WITHOUT HYPERACTIVITY: ICD-10-CM

## 2019-04-22 RX ORDER — DEXTROAMPHETAMINE SACCHARATE, AMPHETAMINE ASPARTATE MONOHYDRATE, DEXTROAMPHETAMINE SULFATE AND AMPHETAMINE SULFATE 5; 5; 5; 5 MG/1; MG/1; MG/1; MG/1
20 CAPSULE, EXTENDED RELEASE ORAL DAILY
Qty: 30 CAPSULE | Refills: 0 | Status: SHIPPED | OUTPATIENT
Start: 2019-04-22 | End: 2019-05-17 | Stop reason: SDUPTHER

## 2019-05-17 ENCOUNTER — TELEPHONE (OUTPATIENT)
Dept: INTERNAL MEDICINE | Age: 49
End: 2019-05-17

## 2019-05-17 DIAGNOSIS — F98.8 ATTENTION DEFICIT DISORDER (ADD) WITHOUT HYPERACTIVITY: ICD-10-CM

## 2019-05-17 RX ORDER — DEXTROAMPHETAMINE SACCHARATE, AMPHETAMINE ASPARTATE MONOHYDRATE, DEXTROAMPHETAMINE SULFATE AND AMPHETAMINE SULFATE 5; 5; 5; 5 MG/1; MG/1; MG/1; MG/1
20 CAPSULE, EXTENDED RELEASE ORAL DAILY
Qty: 30 CAPSULE | Refills: 0 | Status: SHIPPED | OUTPATIENT
Start: 2019-05-22 | End: 2019-06-17 | Stop reason: SDUPTHER

## 2019-06-17 ENCOUNTER — TELEPHONE (OUTPATIENT)
Dept: INTERNAL MEDICINE | Age: 49
End: 2019-06-17

## 2019-06-17 DIAGNOSIS — F98.8 ATTENTION DEFICIT DISORDER (ADD) WITHOUT HYPERACTIVITY: ICD-10-CM

## 2019-06-17 RX ORDER — DEXTROAMPHETAMINE SACCHARATE, AMPHETAMINE ASPARTATE MONOHYDRATE, DEXTROAMPHETAMINE SULFATE AND AMPHETAMINE SULFATE 5; 5; 5; 5 MG/1; MG/1; MG/1; MG/1
20 CAPSULE, EXTENDED RELEASE ORAL DAILY
Qty: 30 CAPSULE | Refills: 0 | Status: SHIPPED | OUTPATIENT
Start: 2019-06-21 | End: 2019-07-24 | Stop reason: SDUPTHER

## 2019-07-16 ENCOUNTER — TELEPHONE (OUTPATIENT)
Dept: INTERNAL MEDICINE | Age: 49
End: 2019-07-16

## 2019-07-24 ENCOUNTER — OFFICE VISIT (OUTPATIENT)
Dept: INTERNAL MEDICINE | Age: 49
End: 2019-07-24

## 2019-07-24 VITALS
HEIGHT: 71 IN | RESPIRATION RATE: 16 BRPM | SYSTOLIC BLOOD PRESSURE: 108 MMHG | HEART RATE: 78 BPM | WEIGHT: 256 LBS | TEMPERATURE: 98.4 F | DIASTOLIC BLOOD PRESSURE: 68 MMHG | BODY MASS INDEX: 35.84 KG/M2

## 2019-07-24 DIAGNOSIS — F98.8 ATTENTION DEFICIT DISORDER (ADD) WITHOUT HYPERACTIVITY: ICD-10-CM

## 2019-07-24 PROCEDURE — 99213 OFFICE O/P EST LOW 20 MIN: CPT | Performed by: INTERNAL MEDICINE

## 2019-07-24 RX ORDER — DEXTROAMPHETAMINE SACCHARATE, AMPHETAMINE ASPARTATE MONOHYDRATE, DEXTROAMPHETAMINE SULFATE AND AMPHETAMINE SULFATE 5; 5; 5; 5 MG/1; MG/1; MG/1; MG/1
20 CAPSULE, EXTENDED RELEASE ORAL DAILY
Qty: 30 CAPSULE | Refills: 0 | Status: SHIPPED | OUTPATIENT
Start: 2019-07-24 | End: 2019-08-22

## 2019-07-24 ASSESSMENT — PATIENT HEALTH QUESTIONNAIRE - PHQ9
2. FEELING DOWN, DEPRESSED OR HOPELESS: NOT AT ALL
SUM OF ALL RESPONSES TO PHQ9 QUESTIONS 1 AND 2: 0
SUM OF ALL RESPONSES TO PHQ9 QUESTIONS 1 AND 2: 0
1. LITTLE INTEREST OR PLEASURE IN DOING THINGS: NOT AT ALL

## 2020-02-25 ENCOUNTER — HOSPITAL ENCOUNTER (OUTPATIENT)
Dept: URGENT CARE | Facility: CLINIC | Age: 50
Discharge: HOME | End: 2020-02-25
Attending: EMERGENCY MEDICINE

## 2020-03-12 ENCOUNTER — OFFICE VISIT (OUTPATIENT)
Dept: CARDIOLOGY | Facility: CLINIC | Age: 50
End: 2020-03-12

## 2020-03-12 ENCOUNTER — CLINICAL SUPPORT NO REQUIREMENTS (OUTPATIENT)
Dept: CARDIOLOGY | Facility: CLINIC | Age: 50
End: 2020-03-12

## 2020-03-12 VITALS
WEIGHT: 232 LBS | SYSTOLIC BLOOD PRESSURE: 98 MMHG | HEIGHT: 70 IN | BODY MASS INDEX: 33.21 KG/M2 | DIASTOLIC BLOOD PRESSURE: 68 MMHG | HEART RATE: 76 BPM

## 2020-03-12 DIAGNOSIS — I49.5 SICK SINUS SYNDROME (HCC): Primary | ICD-10-CM

## 2020-03-12 DIAGNOSIS — R55 SYNCOPE, UNSPECIFIED SYNCOPE TYPE: Primary | ICD-10-CM

## 2020-03-12 PROCEDURE — 93280 PM DEVICE PROGR EVAL DUAL: CPT | Performed by: INTERNAL MEDICINE

## 2020-03-12 PROCEDURE — 99213 OFFICE O/P EST LOW 20 MIN: CPT | Performed by: INTERNAL MEDICINE

## 2020-03-12 PROCEDURE — 93000 ELECTROCARDIOGRAM COMPLETE: CPT | Performed by: INTERNAL MEDICINE

## 2020-03-13 PROBLEM — R55 SYNCOPE: Status: ACTIVE | Noted: 2020-03-13

## 2020-03-13 NOTE — PROGRESS NOTES
Date of Office Visit: 2020  Encounter Provider: Freedom Meeks MD  Place of Service: [facility] CARDIOLOGY  Patient Name: Kingston Hope  :1970    Chief Complaint   Patient presents with   • pacemaker reprogramming check   :     HPI: Kingston Hope is a 49 y.o. male who presents today for follow-up of recurrent syncope.  The patient had a dual-chamber pacemaker placed over 20 years ago for recurrent syncope.  [place]  The patient said he had suffered several episodes of syncope, but has had none since placement of the pacemaker.  His pacing percentage is very low at less than 1%.  The patient reports no concerns, but does report that he had the flu about a month ago.  He reports that he is recovered from this.        Past Medical History:   Diagnosis Date   • Anxiety    • Exertional chest pain     with radiation to left arm.   • History of non-A, non-B viral hepatitis     during childhood.   • History of [place] spotted fever     as a child   • Pacemaker     secondary to intermittent heart block/ asystole.   • Palpitations    • Syncope        Past Surgical History:   Procedure Laterality Date   • OTHER SURGICAL HISTORY      left femur fracture   • PACEMAKER IMPLANTATION  10/12/2010    medtronic  model number [id number], serial number [id number]   • TEMPOROMANDIBULAR JOINT SURGERY         Social History     Socioeconomic History   • Marital status:      Spouse name: Not on file   • Number of children: Not on file   • Years of education: Not on file   • Highest education level: Not on file   Tobacco Use   • Smoking status: Never Smoker   • Smokeless tobacco: Never Used   Substance and Sexual Activity   • Alcohol use: No     Frequency: Never       No family history on file.    Review of Systems   Constitution: Negative.   Cardiovascular: Negative.    Respiratory: Negative.    Gastrointestinal: Negative.        No Known  "Allergies      Current Outpatient Medications:   •  ibuprofen (ADVIL,MOTRIN) 200 MG tablet, Take  by mouth., Disp: , Rfl:       Objective:     Vitals:    03/12/20 1156   BP: 98/68   BP Location: Right arm   Patient Position: Sitting   Cuff Size: Large Adult   Pulse: 76   Weight: 105 kg (232 lb)   Height: 177.8 cm (70\")     Body mass index is 33.29 kg/m².    PHYSICAL EXAM:    Physical Exam   Constitutional: He appears well-developed and well-nourished. No distress.   Cardiovascular: Normal rate and regular rhythm.   Pulmonary/Chest: Effort normal and breath sounds normal. No respiratory distress.   Pacemaker pocket with normal appearance.   Skin: He is not diaphoretic.   Psychiatric: He has a normal mood and affect. His behavior is normal. Thought content normal.           ECG 12 Lead  Date/Time: 3/12/2020 4:51 PM  Performed by: Freedom Meeks MD  Authorized by: Freedom Meeks MD   Comparison: compared with previous ECG from 1/8/2019  Similar to previous ECG  Rhythm: sinus rhythm    Clinical impression: normal ECG              Assessment:       Diagnosis Plan   1. Syncope, unspecified syncope type            Plan:       No episodes of recurrent syncope.  Pacemaker function normal.  Follow-up in 1 year.    As always, it has been a pleasure to participate in your patient's care.      Sincerely,         Freedom Meeks MD  "

## 2020-03-20 ENCOUNTER — HOSPITAL ENCOUNTER (OUTPATIENT)
Dept: URGENT CARE | Facility: CLINIC | Age: 50
Discharge: HOME | End: 2020-03-20

## 2020-03-22 LAB — BACTERIA SPEC AEROBE CULT: NORMAL

## 2020-04-15 ENCOUNTER — TELEMEDICINE CONVERTED (OUTPATIENT)
Dept: FAMILY MEDICINE CLINIC | Facility: CLINIC | Age: 50
End: 2020-04-15
Attending: NURSE PRACTITIONER

## 2020-04-28 ENCOUNTER — OFFICE VISIT CONVERTED (OUTPATIENT)
Dept: ORTHOPEDIC SURGERY | Facility: CLINIC | Age: 50
End: 2020-04-28
Attending: ORTHOPAEDIC SURGERY

## 2020-05-15 ENCOUNTER — TELEMEDICINE CONVERTED (OUTPATIENT)
Dept: FAMILY MEDICINE CLINIC | Facility: CLINIC | Age: 50
End: 2020-05-15
Attending: NURSE PRACTITIONER

## 2020-05-19 ENCOUNTER — HOSPITAL ENCOUNTER (OUTPATIENT)
Dept: LAB | Facility: HOSPITAL | Age: 50
Discharge: HOME | End: 2020-05-19
Attending: NURSE PRACTITIONER

## 2020-05-20 LAB
ALBUMIN SERPL-MCNC: 4.4 G/DL (ref 3.5–5)
ALBUMIN/GLOB SERPL: 1.5 {RATIO} (ref 1.4–2.6)
ALP SERPL-CCNC: 67 U/L (ref 53–128)
ALT SERPL-CCNC: 23 U/L (ref 10–40)
ANION GAP SERPL CALC-SCNC: 20 MMOL/L (ref 8–19)
AST SERPL-CCNC: 24 U/L (ref 15–50)
BASOPHILS # BLD AUTO: 0.05 10*3/UL (ref 0–0.2)
BASOPHILS NFR BLD AUTO: 0.8 % (ref 0–3)
BILIRUB SERPL-MCNC: 0.34 MG/DL (ref 0.2–1.3)
BUN SERPL-MCNC: 15 MG/DL (ref 5–25)
BUN/CREAT SERPL: 16 {RATIO} (ref 6–20)
CALCIUM SERPL-MCNC: 9.1 MG/DL (ref 8.7–10.4)
CHLORIDE SERPL-SCNC: 101 MMOL/L (ref 99–111)
CHOLEST SERPL-MCNC: 204 MG/DL (ref 107–200)
CHOLEST/HDLC SERPL: 5.1 {RATIO} (ref 3–6)
CONV ABS IMM GRAN: 0.01 10*3/UL (ref 0–0.2)
CONV CO2: 21 MMOL/L (ref 22–32)
CONV IMMATURE GRAN: 0.2 % (ref 0–1.8)
CONV TOTAL PROTEIN: 7.4 G/DL (ref 6.3–8.2)
CREAT UR-MCNC: 0.94 MG/DL (ref 0.7–1.2)
DEPRECATED RDW RBC AUTO: 39.4 FL (ref 35.1–43.9)
EOSINOPHIL # BLD AUTO: 0.33 10*3/UL (ref 0–0.7)
EOSINOPHIL # BLD AUTO: 5.6 % (ref 0–7)
ERYTHROCYTE [DISTWIDTH] IN BLOOD BY AUTOMATED COUNT: 11.9 % (ref 11.6–14.4)
GFR SERPLBLD BASED ON 1.73 SQ M-ARVRAT: >60 ML/MIN/{1.73_M2}
GLOBULIN UR ELPH-MCNC: 3 G/DL (ref 2–3.5)
GLUCOSE SERPL-MCNC: 105 MG/DL (ref 70–99)
HCT VFR BLD AUTO: 48.4 % (ref 42–52)
HDLC SERPL-MCNC: 40 MG/DL (ref 40–60)
HGB BLD-MCNC: 16.5 G/DL (ref 14–18)
LDLC SERPL CALC-MCNC: 120 MG/DL (ref 70–100)
LYMPHOCYTES # BLD AUTO: 1.58 10*3/UL (ref 1–5)
LYMPHOCYTES NFR BLD AUTO: 26.8 % (ref 20–45)
MCH RBC QN AUTO: 31.1 PG (ref 27–31)
MCHC RBC AUTO-ENTMCNC: 34.1 G/DL (ref 33–37)
MCV RBC AUTO: 91.3 FL (ref 80–96)
MONOCYTES # BLD AUTO: 0.5 10*3/UL (ref 0.2–1.2)
MONOCYTES NFR BLD AUTO: 8.5 % (ref 3–10)
NEUTROPHILS # BLD AUTO: 3.43 10*3/UL (ref 2–8)
NEUTROPHILS NFR BLD AUTO: 58.1 % (ref 30–85)
NRBC CBCN: 0 % (ref 0–0.7)
OSMOLALITY SERPL CALC.SUM OF ELEC: 285 MOSM/KG (ref 273–304)
PLATELET # BLD AUTO: 141 10*3/UL (ref 130–400)
PMV BLD AUTO: 9.5 FL (ref 9.4–12.4)
POTASSIUM SERPL-SCNC: 4.8 MMOL/L (ref 3.5–5.3)
PSA SERPL-MCNC: 0.47 NG/ML (ref 0–4)
RBC # BLD AUTO: 5.3 10*6/UL (ref 4.7–6.1)
SODIUM SERPL-SCNC: 137 MMOL/L (ref 135–147)
TRIGL SERPL-MCNC: 220 MG/DL (ref 40–150)
TSH SERPL-ACNC: 1.98 M[IU]/L (ref 0.27–4.2)
VLDLC SERPL-MCNC: 44 MG/DL (ref 5–37)
WBC # BLD AUTO: 5.9 10*3/UL (ref 4.8–10.8)

## 2020-06-02 ENCOUNTER — HOSPITAL ENCOUNTER (OUTPATIENT)
Dept: GENERAL RADIOLOGY | Facility: HOSPITAL | Age: 50
Discharge: HOME | End: 2020-06-02
Attending: NURSE PRACTITIONER

## 2020-06-17 ENCOUNTER — OFFICE VISIT CONVERTED (OUTPATIENT)
Dept: NEUROSURGERY | Facility: CLINIC | Age: 50
End: 2020-06-17
Attending: PHYSICIAN ASSISTANT

## 2020-07-09 ENCOUNTER — HOSPITAL ENCOUNTER (OUTPATIENT)
Dept: PHYSICAL THERAPY | Facility: CLINIC | Age: 50
Setting detail: RECURRING SERIES
Discharge: HOME | End: 2020-08-17
Attending: NEUROLOGICAL SURGERY

## 2020-07-23 ENCOUNTER — OFFICE VISIT CONVERTED (OUTPATIENT)
Dept: FAMILY MEDICINE CLINIC | Facility: CLINIC | Age: 50
End: 2020-07-23
Attending: NURSE PRACTITIONER

## 2020-10-29 ENCOUNTER — HOSPITAL ENCOUNTER (OUTPATIENT)
Dept: GENERAL RADIOLOGY | Facility: HOSPITAL | Age: 50
Discharge: HOME | End: 2020-10-29
Attending: NURSE PRACTITIONER

## 2020-10-29 ENCOUNTER — OFFICE VISIT CONVERTED (OUTPATIENT)
Dept: FAMILY MEDICINE CLINIC | Facility: CLINIC | Age: 50
End: 2020-10-29
Attending: NURSE PRACTITIONER

## 2020-10-30 ENCOUNTER — HOSPITAL ENCOUNTER (OUTPATIENT)
Dept: FAMILY MEDICINE CLINIC | Facility: CLINIC | Age: 50
Discharge: HOME | End: 2020-10-30
Attending: NURSE PRACTITIONER

## 2020-11-01 LAB — SARS-COV-2 RNA SPEC QL NAA+PROBE: NOT DETECTED

## 2020-11-16 ENCOUNTER — OFFICE VISIT CONVERTED (OUTPATIENT)
Dept: FAMILY MEDICINE CLINIC | Facility: CLINIC | Age: 50
End: 2020-11-16
Attending: NURSE PRACTITIONER

## 2021-04-20 ENCOUNTER — HOSPITAL ENCOUNTER (OUTPATIENT)
Dept: HOSPITAL 49 - FAS | Age: 51
Discharge: HOME | End: 2021-04-20
Attending: SURGERY
Payer: COMMERCIAL

## 2021-04-20 VITALS — BODY MASS INDEX: 33.21 KG/M2 | WEIGHT: 231.99 LBS | HEIGHT: 70 IN

## 2021-04-20 DIAGNOSIS — Z95.0: ICD-10-CM

## 2021-04-20 DIAGNOSIS — Z20.822: ICD-10-CM

## 2021-04-20 DIAGNOSIS — K20.90: Primary | ICD-10-CM

## 2021-04-20 DIAGNOSIS — K29.50: ICD-10-CM

## 2021-04-20 DIAGNOSIS — K58.9: ICD-10-CM

## 2021-04-20 DIAGNOSIS — G43.909: ICD-10-CM

## 2021-04-20 DIAGNOSIS — F41.9: ICD-10-CM

## 2021-04-20 DIAGNOSIS — Z88.5: ICD-10-CM

## 2021-04-20 DIAGNOSIS — Z98.52: ICD-10-CM

## 2021-04-20 LAB
ALBUMIN SERPL-MCNC: 4.1 G/DL (ref 3.4–5)
ALKALINE PHOSHATASE: 77 U/L (ref 46–116)
ALT SERPL-CCNC: 35 U/L (ref 16–63)
AST: 26 U/L (ref 15–37)
BILIRUBIN - TOTAL: 1.2 MG/DL (ref 0.2–1)
BUN SERPL-MCNC: 9 MG/DL (ref 7–18)
BUN/CREAT RATIO (CALC): 12.2 RATIO
CHLORIDE: 98 MMOL/L (ref 98–107)
CO2 (BICARBONATE): 23 MMOL/L (ref 21–32)
CREATININE: 0.74 MG/DL (ref 0.67–1.17)
GLOBULIN (CALCULATION): 3.3 G/DL
GLUCOSE SERPL-MCNC: 89 MG/DL (ref 74–106)
HCT: 47.1 % (ref 42–52)
HGB BLD-MCNC: 16.9 G/DL (ref 13.2–18)
MCH RBC QN AUTO: 31.5 PG (ref 25–31)
MCHC RBC AUTO-ENTMCNC: 35.9 G/DL (ref 32–36)
MCV: 87.9 FL (ref 78–100)
MPV: 9.1 FL (ref 6–9.5)
PLT: 141 K/UL (ref 150–400)
POTASSIUM: 4.1 MMOL/L (ref 3.5–5.1)
RBC: 5.36 M/UL (ref 4.7–6)
RDW: 11.8 % (ref 11.5–14)
TOTAL PROTEIN: 7.4 G/DL (ref 6.4–8.2)
WBC: 4.5 K/UL (ref 4–10.5)

## 2021-05-10 NOTE — H&P
History and Physical      Patient Name: Kingston Hope   Patient ID: [id number]   Sex: Male   YOB: 1970        Visit Date: June 17, 2020    Provider: Jossie Nelson PA-C   Location: [facility]   Location Address: [address] [place] [address]  [id number]   Location Phone: [phone]          Chief Complaint     Patient is here with complaints of low back pain. MRI at [facility].       History Of Present Illness  The patient is a 49 year old /White male, who presents on referral from REFERRING CARE PROVIDER NAME, for a neurosurgical evaluation of low back pain and right leg pain.   The right leg pain involves the right foot in a nonspecific distribution. The pain developed gradually several years ago. It is 5/10 in severity has an aching, a dull, and a sharp quality and radiates into the right lower leg in a nonspecific distribution. The pain has been waxing and waning in severity. The pain tends to be maximal at no specific time, but waxes and wanes in severity throughout the day. The patient states the pain is aggravated by prolonged standing, walking long distances, lifting, and lying on soft bed. It is alleviated by rest and lying on flat surface, traction, exercises, changing positions.   He denies bowel or bladder dysfunction. The patient has no prior history of neck or back surgery.   RECENT INTERVENTIONS:  He has been previously treated with physical therapy. The physical therapy was partially effective in relieving the pain.   INFORMATION REVIEWED:  The following information was reviewed: radiology reports and images. CT of the lumbar spine revealed degenerative disk disease. The degenerative disc disease is present at L4-5 with foraminal stenosis worse on right.       Past Medical History  Anxiety; Chronic back pain; Diverticulitis; Head injury; Heart Murmur; Hemorrhoids; Hepatitis; Migraine headache; Pacemaker         Past Surgical History  heart surgery;  Pacemaker; Pacemaker.         Medication List  Imitrex 50 mg oral tablet; Lexapro 10 mg oral tablet; Tylenol Arthritis Pain 650 mg oral tablet extended release         Allergy List  NSAIDS       Allergies Reconciled  Family Medical History  Stroke; Family history of brain cancer; Family history of Alzheimer's disease; Family history of lupus erythematosus; Family history of hearing loss         Social History  Alcohol (Never); Alcohol Use (Never); lives with children; lives with spouse; .; Recreational Drug Use (Never); Tobacco (Never); Working         Immunizations  Name Date Admin   Influenza Refused         Review of Systems  · Constitutional  o Denies  o : chills, excessive sweating, fatigue, fever, sycope/passing out, weight gain, weight loss  · Eyes  o Denies  o : changes in vision, blurry vision, double vision  · HENT  o Denies  o : loss of hearing, ringing in the ears, ear aches, sore throat, nasal congestion, sinus pain, nose bleeds, seasonal allergies  · Cardiovascular  o Denies  o : blood clots, swollen legs, anemia, easy burising or bleeding, transfusions  · Respiratory  o Denies  o : shortness of breath, dry cough, productive cough, pneumonia, COPD  · Gastrointestinal  o Denies  o : difficulty swallowing, reflux  · Genitourinary  o Denies  o : incontinence  · Neurologic  o Denies  o : headache, seizure, stroke, tremor, loss of balance, falls, dizziness/vertigo, difficulty with sleep, numbness/tingling/paresthesia , difficulty with coordination, difficulty with dexterity, weakness  · Musculoskeletal  o Admits  o : low back pain  o Denies  o : neck stiffness/pain, swollen lymph nodes, muscle aches, joint pain, weakness, spasms, sciatica, pain radiating in arm, pain radiating in leg  · Endocrine  o Denies  o : diabetes, thyroid disorder  · Psychiatric  o Denies  o : anxiety, depression      Vitals  Date Time BP Position Site L\R Cuff Size HR RR TEMP (F) WT  HT  BMI kg/m2 BSA m2 O2 Sat HC      "  06/17/2020 02:38 PM        96.7 236lbs 0oz 5'  10\" 33.86 2.3           Physical Examination  · Constitutional  o Appearance  o : well-nourished, well developed, alert, in no acute distress  · Respiratory  o Respiratory Effort  o : breathing unlabored  · Cardiovascular  o Peripheral Vascular System  o :   § Extremities  § : no edema or cyanosis  · Musculoskeletal  o Spine  o :   § Inspection/Palpation  § : tenderness to palpation present in lower spine.   o Right Lower Extremity  o :   § Inspection/Palpation  § : no joint or limb tenderness to palpation, no edema present, no ecchymosis  § Joint Stability  § : joint stability within normal limits  § Range of Motion  § : range of motion normal, no joint crepitations present, no pain on motion, Uzair's test negative  o Left Lower Extremity  o :   § Inspection/Palpation  § : no joint or limb tenderness to palpation, no edema present, no ecchymosis  § Joint Stability  § : joint stability within normal limits  § Range of Motion  § : range of motion normal, no joint crepitations present, no pain on motion, Uzair's test negative  · Skin and Subcutaneous Tissue  o Extremities  o :   § Right Lower Extremity  § : no lesions or areas of discoloration  § Left Lower Extremity  § : no lesions or areas of discoloration  o Back  o : no lesions or areas of discoloration  · Neurologic  o Mental Status Examination  o :   § Orientation  § : alert and oriented to time, person, place and events  o Motor Examination  o :   § RLE Strength  § : strength normal  § RLE Motor Function  § : tone normal, no atrophy, no abnormal movements noted  § LLE Strength  § : strength normal  § LLE Motor Function  § : tone normal, no atrophy, no abnormal movements noted  o Reflexes  o :   § RLE  § : knee and ankle reflexes 2/4, SLR negative  § LLE  § : knee and ankle reflexes 2/4, SLR negative  o Sensation  o :   § Light Touch  § : sensation intact to light touch in extremities  o Gait and Station  o : "   § Gait Screening  § : normal gait, able to stand without difficulty  · Psychiatric  o Mood and Affect  o : mood normal, affect appropriate          Assessment  · Lumbago/low back pain     724.3/M54.40  · Lumbar disc herniation, L4-5     722.10/M51.26  · Sciatica     724.3/M54.30      Plan  · Orders  o PHYSICAL THERAPY CONSULTATION (PHYTH) - 724.3/M54.30, 724.3/M54.40, 722.10/M51.26 - 06/17/2020  o XR lumbar spine, 2-3 bending views (12417) - 724.3/M54.30, 724.3/M54.40, 722.10/M51.26 - 06/17/2020  · Medications  o Medications have been Reconciled  o Transition of Care or Provider Policy  · Instructions  o Encouraged to follow-up with Primary Care Provider for preventative care.  o The ROS and the PFSH were reviewed at today's visit.  o I have discussed the risks and benefits of surgery versus physical therapy, epidural steroids, and other conservative forms of treatment.  o Given these options, the patient wishes to exhaust all forms of non-operative management.  o The patient wishes a referral to physical therapy.  o The patient wishes to proceed with epidural steroid injections.  o Handouts provided: Non-Surgical Treatments for Back Pain/Degenerative Disc Disease.  o We have discussed the benefits of core strengthening. Patient will work on improving the core muscle strength with low impact activities such as walking, swimming, Pilates, etc.   o Call or return to office if symptoms worsen or persist.  o Will order bending xray, LESIs, and PT for patient. Will return to us in 8 weeks. Will work on weight loss.   o Electronically Identified Patient Education Materials Provided Electronically  · Disposition  o Call or Return if symptoms worsen or persist.            Electronically Signed by: Jossie Nelson PA-C -Author on June 17, 2020 03:35:09 PM

## 2021-05-10 NOTE — H&P
History and Physical      Patient Name: Kingston Hope   Patient ID: [id number]   Sex: Male   YOB: 1970    Referring Provider: Toan Collado MD    Visit Date: April 28, 2020    Provider: Toan Collado MD   Location: [facility]   Location Address: [address] [place] [address]  [id number]   Location Phone: [phone]          Chief Complaint  · Left Elbow Pain  · Left Thumb Pain      History Of Present Illness  Kingston Hope is a 49 year old /White male who presents today to [facility]. He is here for evaluation of his left thumb. He has been having pain in his thumb that has been going on for the last few months. He said he has a little shoulder pain and a little tennis elbow type symptoms and now it has kind of settled in the base of his thumb.       Past Medical History  Anxiety; Diverticulitis; Head injury; Heart Murmur; Hemorrhoids; Hepatitis; Migraine headache; Pacemaker         Past Surgical History  Pacemaker         Medication List  Imitrex 50 mg oral tablet; Lexapro 10 mg oral tablet; Mobic 15 mg oral tablet         Allergy List  NO KNOWN DRUG ALLERGIES         Family Medical History  Family history of brain cancer; Family history of Alzheimer's disease; Family history of lupus erythematosus; Family history of hearing loss         Social History  Alcohol (Never); Tobacco (Former)         Review of Systems  · Constitutional  o Denies  o : fever, chills, weight loss  · Cardiovascular  o Denies  o : chest pain, shortness of breath  · Gastrointestinal  o Denies  o : liver disease, heartburn, nausea, blood in stools  · Genitourinary  o Denies  o : painful urination, blood in urine  · Integument  o Denies  o : rash, itching  · Neurologic  o Denies  o : headache, weakness, loss of consciousness  · Musculoskeletal  o Denies  o : painful, swollen joints  · Psychiatric  o Denies  o : drug/alcohol addiction, anxiety, depression      Vitals  Date Time BP  "Position Site L\R Cuff Size HR RR TEMP (F) WT  HT  BMI kg/m2 BSA m2 O2 Sat HC       04/28/2020 08:57 AM         226lbs 0oz 5'  10\" 32.43 2.25           Physical Examination  · Constitutional  o Appearance  o : well developed, well-nourished, no obvious deformities present  · Head and Face  o Head  o :   § Inspection  § : normocephalic  o Face  o :   § Inspection  § : no facial lesions  · Eyes  o Conjunctivae  o : conjunctivae normal  o Sclerae  o : sclerae white  · Ears, Nose, Mouth and Throat  o Ears  o :   § External Ears  § : appearance within normal limits  § Hearing  § : intact  o Nose  o :   § External Nose  § : appearance normal  · Neck  o Inspection/Palpation  o : normal appearance  o Range of Motion  o : full range of motion  · Respiratory  o Respiratory Effort  o : breathing unlabored  o Inspection of Chest  o : normal appearance  o Auscultation of Lungs  o : no audible wheezing or rales  · Cardiovascular  o Heart  o : regular rate  · Gastrointestinal  o Abdominal Examination  o : soft and non-tender  · Skin and Subcutaneous Tissue  o General Inspection  o : intact, no rashes  · Psychiatric  o General  o : Alert and oriented x3  o Judgement and Insight  o : judgment and insight intact  o Mood and Affect  o : mood normal, affect appropriate  · Left Hand  o Inspection  o : Tender with palpation along the CMC joint. No real grind test. No real locking or catching. A little bit of tenderness over his A1-Pulley. Sensation intact. Pulse is normal. Good finger range of motion. Good wrist range of motion.           Assessment  · Pain of left upper extremity     729.5/M79.602  · Left elbow pain     719.42/M25.522  · Arthralgia of left hand     719.44/M25.542  · Left CMC osteoarthritis     716.94/M19.049      Plan  · Medications  o Medications have been Reconciled  o Transition of Care or Provider Policy  · Instructions  o Reviewed the patient's Past Medical, Social, and Family history as well as the ROS at today's " visit, no changes.  o Call or return if worsening symptoms.  o This note is transcribed by Sabine Bruce mc  o Going to try a brace, anti-inflammatories and see how he does.             Electronically Signed by: Sabine Bruce, -Author on April 29, 2020 08:39:15 AM  Electronically Co-signed by: Toan Collado MD -Reviewer on April 30, 2020 06:07:02 PM

## 2021-05-12 NOTE — PROGRESS NOTES
Progress Note      Patient Name: Kingston Hope   Patient ID: [id number]   Sex: Male   YOB: 1970        Visit Date: April 15, 2020    Provider: SHELDON Ennis   Location: [facility]   Location Address: [address] [place] [address]  [id number]   Location Phone: [phone]          Chief Complaint  · New Patient/Establish Care      History Of Present Illness  Video Conferencing Visit  Kingston Hope is a 49 year old /White male who is presenting for evaluation via video conferencing. Verbal consent obtained before beginning visit.   The following staff were present during this visit: Amelia Arrieta CMA   Kingston Hope is a 49 year old /White male who presents for evaluation and treatment of:      New Patient/Establish Care  Previous PCP was in [place]. Has been a few yrs    Pt c/o back problems with lots of pain and Tennis elbow.  Back pain has been going on for 4 yrs.  Has gained more weight over the yrs.    Left elbow pain wearing bands.    Chronic ongoing back pain since 2015. Pt does HEP without relief. Pt reports he has failed PT at [facility] for 1.5 month with minimal improvement. Pt has seen chiropractor. pt had injection at pain clinic in [place] without improvement. pt reports radiculopathy of left leg to toes. worse after sleeping. pt has failed NSAID's and Tylenol. pt has not been on gabapentin.    PMHx of Pacemaker for sick sinus syndrome, at age 28.    Pt also c/o migraine headaches, could be part of the back pain. Pt reports h/a daily. Migraines 1-2 every two weeks. pt has vision disturbance. Admits to nausea, no vomiting. A lot of sensitivity.  Seen by [facility]. Helped some.  Having headaches daily, once a week turns into migraine with light sensitivity.  Will take Ibuprofen PRN (Wife hides on him so he doesn't take as much because she says is bad for him.)    pt has tramatic childhood. Pt has seen therapist in his  20's. Pt has strong support syndrome. Strong belief in Ned.     Flu shot-declined (Positive for Flu A this past flu season)  Former Smoker- Started at 19 yrs old, Stopped at 21 yrs old, smoking 1PPD       Past Medical History  Disease Name Date Onset Notes   Anxiety --  --    Diverticulitis --  --    Head injury --  Previous   Heart Murmur --  --    Hemorrhoids --  --    Hepatitis 1975 As a child, was hospitalized   Migraine headache --  --    Pacemaker 04/15/2020 Sees [facility].         Past Surgical History  Procedure Name Date Notes   Pacemaker 1998 Weak Sinus Node         Allergy List  Allergen Name Date Reaction Notes   NO KNOWN DRUG ALLERGIES --  --  --          Family Medical History  Disease Name Relative/Age Notes   Family history of brain cancer  --    Family history of Alzheimer's disease  --    Family history of lupus erythematosus  --    Family history of hearing loss  --          Social History  Finding Status Start/Stop Quantity Notes   Alcohol Never --/-- --  --    Tobacco Former 19/21 1PPD --          Review of Systems  · Constitutional  o Denies  o : fatigue, fever, weight gain, weight loss, chills  · HENT  o Denies  o : ear pain, sore throat  · Cardiovascular  o Denies  o : chest Pain, palpitations, edema (swelling)  · Respiratory  o Denies  o : frequent cough, shortness of breath  · Gastrointestinal  o Denies  o : nausea, vomiting, changes in bowel habits  · Genitourinary  o Denies  o : dysuria, urinary frequency, urinary urgency, polyuria  · Neurologic  o Admits  o : tingling or numbness  o Denies  o : headache, dizziness  · Musculoskeletal  o Admits  o : back pain, muscle weakness  o Denies  o : joint pain, myalgias  · Endocrine  o Denies  o : polydipsia, polyphagia  · Psychiatric  o Admits  o : anxiety  o Denies  o : mood changes, memory changes, SI/HI  · Allergic-Immunologic  o Denies  o : eczema, seasonal allergies, urticaria      Physical  Examination  · Constitutional  o Appearance  o : NAD, alert and oriented, pleasant  · Skin and Subcutaneous Tissue  o General Inspection  o : Color normal, no rash, good turgor  · Neurologic  o Mental Status Examination  o :   § Orientation  § : grossly oriented to person, place and time     pt positive for phalens on left, negative for tinnels.           Assessment  · Anxiety disorder     300.00/F41.9  · Chronic left-sided low back pain with left-sided sciatica       Lumbago with sciatica, left side     724.2/M54.42  Other chronic pain     724.2/G89.29  · Back pain     724.5/M54.9  · Migraine     346.90/G43.909  · Establishing care with new doctor, encounter for     V65.8/Z76.89  · Migraine headache     346.90/G43.909  · Pacemaker     V45.01/Z95.0  Sees [facility].  · Elbow pain, left     719.42/M25.522  · Weakness of left lower extremity     729.89/R29.898      Plan  · Orders  o Male Physical Primary Care Panel (CMP, CBC, TSH, Lipid, PSA) [facility] (70582, 00689, 82122, 64035, 90415, ) - - 04/15/2020  o ACO-39: Current medications updated and reviewed () - - 04/15/2020  o ACO-14: Influenza immunization was not administered for reasons documented () - - 04/15/2020  o ACO - Pt declines to or was not able to provide an Advance Care Plan or name a Surrogate Decision Maker (1124F) - - 04/15/2020  o ORTHOPEDIC CONSULTATION (ORTHO) - 719.42/M25.522 - 04/15/2020  o CT Lumbar Spine without IV Contrast [facility] (54521) - 724.5/M54.9, 724.2/M54.42, 724.2/G89.29, 729.89/R29.898 - 04/15/2020  · Medications  o Imitrex 50 mg oral tablet   SIG: take 1 tab with fluids at onset of a migraine attack;may repeat after 2 hours if headache returns, not to exceed 200mg in 24hrs.   DISP: (14) tablets with 0 refills  Prescribed on 04/15/2020     o Lexapro 10 mg oral tablet   SIG: take 1 tablet by oral route once a day (at bedtime) for 30 days   DISP: (30) tablets with 1 refills  Prescribed on 04/15/2020      · Instructions  o Patient was given an SSRI/SSNRI medication and warned of possible side effects of the medication including potential for increased risk of suicidal thoughts and feelings. Patient was instructed that if they begin to exhibit any of these effects they will discontinue the medication immediately and contact our office or the ER ASAP.  o Patient was educated/instructed on their diagnosis, treatment and medications prior to discharge from the clinic today.  o Call the office with any concerns or questions.  o Flu vaccine declined.  · Disposition  o Return to Office in 1 month.            Electronically Signed by: SHELDON Ennis -Author on April 15, 2020 10:20:15 AM

## 2021-05-13 NOTE — PROGRESS NOTES
Progress Note      Patient Name: Kingston Hope   Patient ID: [id number]   Sex: Male   YOB: 1970        Visit Date: May 15, 2020    Provider: SHELDON Ennis   Location: [facility]   Location Address: [address] [place] [address]  [id number]   Location Phone: [phone]          Chief Complaint  · 1 Month Follow up for Anxiety and Migraine Headaches      History Of Present Illness  Video Conferencing Visit  Kingston Hope is a 49 year old /White male who is presenting for evaluation via video conferencing. Verbal consent obtained before beginning visit.   The following staff were present during this visit: Amelia Arrieta CMA   Kingston Hope is a 49 year old /White male who presents for evaluation and treatment of:      1 Month Follow up for Anxiety and Migraine Headaches  Taking Lexapro for Anxiety, pt reported doing well on the medication.  Taking Imitrex for Migraine headaches, pt said he has not needed to take it yet.    Lab work ordered on 4/15/20, due.    CT Lumbar Spine scheduled 4/23/20. Pt had rescheduled due to his daughter had his first grandchild.  Needs to be rescheduled.    Ortho consult was on 4/28/20 with Dr. Collado for elbow pain and Lt hand pain. office note in [facility].  Dx'd with Arthritis given Mobic to try. Pt said he took for 4 days then started breaking out, so stopped taking.   Using wrist brace currently . Pain level has improved.       Past Medical History  Disease Name Date Onset Notes   Anxiety --  --    Chronic back pain --  --    Diverticulitis --  --    Head injury --  Previous   Heart Murmur --  --    Hemorrhoids --  --    Hepatitis --  As a child, was hospitalized   Migraine headache --  --    Pacemaker 04/15/2020 Sees [facility] cardiology.         Past Surgical History  Procedure Name Date Notes   heart surgery --  --    Pacemaker 1998 Weak Sinus Node   Pacemaker. --  --          Medication List  Name Date  Started Instructions   Imitrex 50 mg oral tablet 04/15/2020 take 1 tab with fluids at onset of a migraine attack;may repeat after 2 hours if headache returns, not to exceed 200mg in 24hrs.   Lexapro 10 mg oral tablet 04/15/2020 take 1 tablet by oral route once a day (at bedtime) for 30 days         Allergy List  Allergen Name Date Reaction Notes   NO KNOWN DRUG ALLERGIES --  --  --    NO KNOWN DRUG ALLERGIES --  --  --          Family Medical History  Disease Name Relative/Age Notes   Stroke Mother/   Mother   Family history of brain cancer  --    Family history of Alzheimer's disease  --    Family history of lupus erythematosus  --    Family history of hearing loss  --          Social History  Finding Status Start/Stop Quantity Notes   Alcohol Never --/-- --  --    Alcohol Use Never --/-- --  does not drink   lives with children --  --/-- --  --    lives with spouse --  --/-- --  --    . --  --/-- --  --    Recreational Drug Use Never --/-- --  no   Tobacco Never --/-- --  never smoker   Working --  --/-- --  --          Immunizations  NameDate Admin Mfg Trade Name Lot Number Route Inj VIS Given VIS Publication   InfluenzaRefused 04/15/2020 NE Not Entered  NE NE     Comments: Pt declined         Review of Systems  · Constitutional  o Denies  o : fatigue, fever, weight gain, weight loss, chills  · Cardiovascular  o Denies  o : chest Pain, palpitations, edema (swelling)  · Respiratory  o Denies  o : frequent cough, shortness of breath  · Gastrointestinal  o Denies  o : nausea, vomiting, changes in bowel habits  · Genitourinary  o Denies  o : dysuria, urinary frequency, urinary urgency, polyuria  · Neurologic  o Denies  o : headache, tingling or numbness, dizziness  · Musculoskeletal  o Admits  o : elbow pain, hand pain  o Denies  o : joint pain, myalgias  · Endocrine  o Denies  o : polydipsia, polyphagia  · Psychiatric  o Admits  o : anxiety  o Denies  o : mood changes, memory changes, SI/HI      Vitals  Date  "Time BP Position Site L\R Cuff Size HR RR TEMP (F) WT  HT  BMI kg/m2 BSA m2 O2 Sat        04/28/2020 08:57 AM         226lbs 0oz 5'  10\" 32.43 2.25           Physical Examination  · Constitutional  o Appearance  o : NAD, alert and oriented, pleasant  · Respiratory  o Respiratory Effort  o : breathing unlabored, no accessory muscle use  · Skin and Subcutaneous Tissue  o General Inspection  o : Color normal, no rash, good turgor  · Neurologic  o Mental Status Examination  o :   § Orientation  § : grossly oriented to person, place and time          Assessment  · Anxiety disorder     300.00/F41.9  · Lumbago     724.2/M54.5  will reschedule CT  · Migraine headache     346.90/G43.909  · Elbow pain, left     719.42/M25.522  · Wrist pain     719.43/M25.539  · Arthritis     716.90/M19.90    Problems Reconciled  Plan  · Orders  o ACO-39: Current medications updated and reviewed () - - 05/15/2020  · Medications  o Tylenol Arthritis Pain 650 mg oral tablet extended release   SIG: take 2 tablets (1,300 mg) by oral route every 8 hours swallowing whole with water. Do not break, crush, dissolve and/or chew. for 30 days   DISP: (180) tablets with 0 refills  Prescribed on 05/15/2020     o Lexapro 10 mg oral tablet   SIG: take 1 tablet by oral route once a day (at bedtime) for 90 days   DISP: (90) tablets with 1 refills  Adjusted on 05/15/2020     o Medications have been Reconciled  o Transition of Care or Provider Policy  · Instructions  o Patient was educated/instructed on their diagnosis, treatment and medications prior to discharge from the clinic today.  · Disposition  o Return to Office in 6 months.            Electronically Signed by: SHELDON Ennis -Author on May 15, 2020 09:58:32 AM  "

## 2021-05-13 NOTE — PROGRESS NOTES
"   Progress Note      Patient Name: Kingston Hope   Patient ID: [id number]   Sex: Male   YOB: 1970        Visit Date: July 23, 2020    Provider: SHELDON Ennis   Location: [facility]   Location Address: [address] [place] [address]  [id number]   Location Phone: [phone]          Chief Complaint  · Back Pain      History Of Present Illness  Kingston Hope is a 49 year old /White male who presents for evaluation and treatment of:      Pt c/o back pain, had first injection at [facility] on 07/06/20 (last office visit). Pt c/o pain being worse since then. Pt is afraid to go back for second injection. Lumbago improved. Pt reports physical therapy was bad and did not help. Pain worse at night when trying to sleep.    Pt also c/o chest pain/shortness of breath since February when he had the flu. Coughing off and on. non productive. worse with mowing and the trees were blooming.     Went to [facility] in early march. Never tested for COVID. No Family has had COVID.    Anxiety - pt has been to therapy previously. pt reports Lexapro has been helping him.    IBS - ongoing for \"months\". pt had Constipation then since Lexapro he has been more regular. Pt has made diet modifications and cut back on sugar.     Weight loss of 4 lbs with diet modifications.       Past Medical History  Disease Name Date Onset Notes   Anxiety --  --    Chronic back pain --  --    Diverticulitis --  --    Head injury --  Previous   Heart Murmur --  --    Hemorrhoids --  --    Hepatitis --  As a child, was hospitalized   Migraine headache --  --    Pacemaker 04/15/2020 Sees [facility] cardiology.         Past Surgical History  Procedure Name Date Notes   heart surgery --  --    Pacemaker 1998 Weak Sinus Node   Pacemaker. --  --          Medication List  Name Date Started Instructions   Imitrex 50 mg oral tablet 04/15/2020 take 1 tab with fluids at onset of a migraine attack;may " repeat after 2 hours if headache returns, not to exceed 200mg in 24hrs.   Lexapro 10 mg oral tablet 05/15/2020 take 1 tablet by oral route once a day (at bedtime) for 90 days   Tylenol Arthritis Pain 650 mg oral tablet extended release 05/15/2020 take 2 tablets (1,300 mg) by oral route every 8 hours swallowing whole with water. Do not break, crush, dissolve and/or chew. for 30 days         Allergy List  Allergen Name Date Reaction Notes   NSAIDS --  --  --          Family Medical History  Disease Name Relative/Age Notes   Stroke Mother/   Mother   Family history of brain cancer  --    Family history of Alzheimer's disease  --    Family history of lupus erythematosus  --    Family history of hearing loss  --          Social History  Finding Status Start/Stop Quantity Notes   Alcohol Never --/-- --  --    Alcohol Use Never --/-- --  does not drink   lives with children --  --/-- --  --    lives with spouse --  --/-- --  --    . --  --/-- --  --    Recreational Drug Use Never --/-- --  no   Tobacco Never --/-- --  never smoker   Working --  --/-- --  --          Immunizations  NameDate Admin Mfg Trade Name Lot Number Route Inj VIS Given VIS Publication   InfluenzaRefused 04/15/2020 NE Not Entered  NE NE     Comments: Pt declined         Review of Systems  · Constitutional  o Denies  o : fatigue, fever, weight gain, weight loss, chills  · HENT  o Admits  o : ear pain, postnasal drainage  o Denies  o : sore throat  · Cardiovascular  o Denies  o : chest Pain, palpitations, edema (swelling)  · Respiratory  o Admits  o : frequent cough, shortness of breath, pleuritic pain  · Gastrointestinal  o Denies  o : nausea, vomiting, changes in bowel habits  · Genitourinary  o Denies  o : dysuria, urinary frequency, urinary urgency, polyuria  · Neurologic  o Denies  o : headache, tingling or numbness, dizziness  · Musculoskeletal  o Admits  o : back pain  o Denies  o : joint pain, myalgias  · Psychiatric  o Admits  o :  "anxiety, sleep distrubances  o Denies  o : difficulty sleeping, mood changes, memory changes, SI/HI  · Allergic-Immunologic  o Admits  o : seasonal allergies  o Denies  o : eczema, urticaria      Vitals  Date Time BP Position Site L\R Cuff Size HR RR TEMP (F) WT  HT  BMI kg/m2 BSA m2 O2 Sat        07/23/2020 08:28 /71 Sitting    79 - R 18 97.9 231lbs 6oz 5'  10\" 33.2 2.28 97 %          Physical Examination  · Constitutional  o Appearance  o : well-nourished, in no acute distress  · Eyes  o Conjunctivae  o : conjunctivae normal  o Sclerae  o : sclerae white  o Pupils and Irises  o : pupils equal and round  o Eyelids/Ocular Adnexae  o : eyelid appearance normal, no exudates present  · Ears, Nose, Mouth and Throat  o Ears  o :   § External Ears  § : external auditory canal appearance within normal limits, no discharge present  § Otoscopic Examination  § : fluid bubbles present behind tympanic membrane   o Nose  o :   § External Nose  § : appearance normal  § Intranasal Exam  § : mucosa within normal limits, vestibules normal, no intranasal lesions present, septum midline, sinuses non tender to palpation  § Nasopharynx  § : no discharge present  o Oral Cavity  o :   § Oral Mucosa  § : oral mucosa normal  § Lips  § : lip appearance normal  § Teeth  § : normal dentation for age  o Throat  o :   § Oropharynx  § : mild oropharynx inflammation present, tonsils within normal limits  · Respiratory  o Respiratory Effort  o : breathing unlabored  o Inspection of Chest  o : normal appearance  o Auscultation of Lungs  o : normal breath sounds throughout inspiration and expiration  · Cardiovascular  o Heart  o :   § Auscultation of Heart  § : regular rate and rhythm, no murmurs, gallops or rubs  o Peripheral Vascular System  o :   § Carotid Arteries  § : normal pulses bilaterally, no bruits present  § Extremities  § : no clubbing or edema  · Gastrointestinal  o Abdominal Examination  o : abdomen nontender to palpation, tone " normal without rigidity or guarding, no masses present, bowel sounds present  · Musculoskeletal  o Spine  o :   § Inspection/Palpation  § : tenderness to palpation present   § Range of Motion  § : spine range of motion normal  o Right Lower Extremity  o :   § Inspection/Palpation  § : no joint or limb tenderness to palpation, ROM normal  o Left Lower Extremity  o :   § Inspection/Palpation  § : no joint or limb tenderness to palpation, ROM normal  · Skin and Subcutaneous Tissue  o General Inspection  o : no rashes or lesions present, no areas of discoloration  o Body Hair  o : hair normal for age, general body hair distribution normal for age  o Digits and Nails  o : no clubbing, cyanosis, deformities or edema present, normal appearing nails  · Neurologic  o Mental Status Examination  o :   § Orientation  § : grossly oriented to person, place and time  o Gait and Station  o : normal gait, able to stand without difficulty  · Psychiatric  o Judgement and Insight  o : judgment and insight intact  o Mood and Affect  o : mood normal, affect appropriate          Assessment  · Screening for depression     V79.0/Z13.89  · Allergic rhinitis due to allergen     477.9/J30.9  · Anxiety disorder     300.00/F41.9  · Back pain     724.5/M54.9  · Shortness of breath     786.05/R06.02  · BMI 33.0-33.9,adult     V85.33/Z68.33  · IBS (irritable bowel syndrome)     564.1/K58.9      Plan  · Orders  o ACO-39: Current medications updated and reviewed () - - 07/23/2020  o ACO-18: Positive screen for clinical depression using a standardized tool and a follow-up plan documented () - - 07/23/2020  · Medications  o compound cream   SIG: apply nightly   DISP: (90) grams with 0 refills  Prescribed on 07/23/2020     o fluticasone propionate 50 mcg/actuation nasal spray,suspension   SIG: spray 1 - 2 sprays in each nostril by intranasal route once daily   DISP: (1) 15.8 ml aer w/adap with 2 refills  Prescribed on 07/23/2020      o cetirizine 10 mg oral tablet   SIG: take 1 tablet by oral route once a day (at bedtime)   DISP: (30) tablets with 2 refills  Prescribed on 07/23/2020     · Instructions  o Depression Screen completed and scanned into the EMR under the designated folder within the patient's documents.  o Today's PHQ-9 result is _6__  o Patient was given an SSRI/SSNRI medication and warned of possible side effects of the medication including potential for increased risk of suicidal thoughts and feelings. Patient was instructed that if they begin to exhibit any of these effects they will discontinue the medication immediately and contact our office or the ER ASAP.  o Patient was educated/instructed on their diagnosis, treatment and medications prior to discharge from the clinic today.  o Call the office with any concerns or questions.  · Disposition  o FOLLOW UP PRN            Electronically Signed by: SHELDON Ennis -Author on July 23, 2020 01:28:53 PM

## 2021-05-13 NOTE — PROGRESS NOTES
Progress Note      Patient Name: Kingston Hope   Patient ID: [id number]   Sex: Male   YOB: 1970        Visit Date: October 29, 2020    Provider: SHELDON Ennis   Location: [facility]   Location Address: [address] [place] [address]  [id number]   Location Phone: [phone]          Chief Complaint  · Acute Visit for Cough      History Of Present Illness  Kingston Hope is a 50 year old /White male who presents for evaluation and treatment of:      Acute Visit for Cough. ongoing for 5 days.  Pt denies having any fever, loss of taste, or muscle aches.  Having some sorthroat due to cough which is non-productive, some sinus drainage, and shortness of breath.    Took Nyquil Nite Time last night.  Pt said he is not taking Cetirizine due to caused him to be too dryed out.   Has not been using Flonase nasal spray daily.    Pt requested med refill of Lexapro.       Past Medical History  Disease Name Date Onset Notes   Anxiety --  --    Chronic back pain --  --    Diverticulitis --  --    Head injury --  Previous   Heart Murmur --  --    Hemorrhoids --  --    Hepatitis --  As a child, was hospitalized   Migraine headache --  --    Pacemaker 04/15/2020 Sees [facility] cardiology.         Past Surgical History  Procedure Name Date Notes   heart surgery --  --    Pacemaker 1998 Weak Sinus Node   Pacemaker. --  --          Medication List  Name Date Started Instructions   cetirizine 10 mg oral tablet 07/23/2020 take 1 tablet by oral route once a day (at bedtime)   compound cream 07/23/2020 apply nightly   fluticasone propionate 50 mcg/actuation nasal spray,suspension 07/23/2020 spray 1 - 2 sprays in each nostril by intranasal route once daily   Imitrex 50 mg oral tablet 04/15/2020 take 1 tab with fluids at onset of a migraine attack;may repeat after 2 hours if headache returns, not to exceed 200mg in 24hrs.   Lexapro 10 mg oral tablet 05/15/2020  take 1 tablet by oral route once a day (at bedtime) for 90 days   Tylenol Arthritis Pain 650 mg oral tablet extended release 05/15/2020 take 2 tablets (1,300 mg) by oral route every 8 hours swallowing whole with water. Do not break, crush, dissolve and/or chew. for 30 days         Allergy List  Allergen Name Date Reaction Notes   NSAIDS --  --  --          Family Medical History  Disease Name Relative/Age Notes   Family history of brain cancer  --    Family history of stroke Mother/   --    Family history of Alzheimer's disease  --    Family history of lupus erythematosus  --    Family history of hearing loss  --          Social History  Finding Status Start/Stop Quantity Notes   Alcohol Never --/-- --  --    Alcohol Use Never --/-- --  does not drink   lives with children --  --/-- --  --    lives with spouse --  --/-- --  --    . --  --/-- --  --    Recreational Drug Use Never --/-- --  no   Tobacco Never --/-- --  never smoker   Working --  --/-- --  --          Immunizations  NameDate Admin Mfg Trade Name Lot Number Route Inj VIS Given VIS Publication   InfluenzaRefused 04/15/2020 NE Not Entered  NE NE     Comments: Pt declined         Review of Systems  · Constitutional  o Denies  o : fatigue, fever, weight gain, weight loss, chills  · HENT  o Admits  o : postnasal drainage  o Denies  o : ear pain, sore throat  · Cardiovascular  o Denies  o : chest Pain, palpitations, edema (swelling)  · Respiratory  o Admits  o : frequent cough, shortness of breath  · Gastrointestinal  o Denies  o : nausea, vomiting, changes in bowel habits  · Genitourinary  o Denies  o : dysuria, urinary frequency, urinary urgency, polyuria  · Neurologic  o Denies  o : headache, tingling or numbness, dizziness  · Musculoskeletal  o Denies  o : joint pain, myalgias  · Endocrine  o Denies  o : polydipsia, polyphagia  · Psychiatric  o Admits  o : anxiety  o Denies  o : mood changes, memory changes,  "SI/HI  · Allergic-Immunologic  o Denies  o : eczema, seasonal allergies, urticaria      Vitals  Date Time BP Position Site L\R Cuff Size HR RR TEMP (F) WT  HT  BMI kg/m2 BSA m2 O2 Sat FR L/min FiO2 HC       07/23/2020 08:28 /71 Sitting    79 - R 18 97.9 231lbs 6oz 5'  10\" 33.2 2.28 97 %      10/29/2020 01:50 /84 Sitting    83 - R 24 98.7 235lbs 8oz 5'  10\" 33.79 2.3 94 %            Physical Examination  · Constitutional  o Appearance  o : well-nourished, well developed, noted to be uncomfortable    · Eyes  o Conjunctivae  o : conjunctivae normal  o Sclerae  o : sclerae white  o Pupils and Irises  o : pupils equal and round  o Eyelids/Ocular Adnexae  o : eyelid appearance normal, no exudates present  · Ears, Nose, Mouth and Throat  o Ears  o :   § External Ears  § : external auditory canal appearance within normal limits, no discharge present  § Otoscopic Examination  § : tympanic membrane appearance within normal limits bilaterally, cerumen not present  o Nose  o :   § External Nose  § : appearance normal  § Intranasal Exam  § : mucosa within normal limits, vestibules normal, no intranasal lesions present, septum midline, sinuses non tender to palpation  § Nasopharynx  § : no discharge present  o Oral Cavity  o :   § Oral Mucosa  § : oral mucosa normal  § Lips  § : lip appearance normal  § Teeth  § : normal dentation for age  o Throat  o :   § Oropharynx  § : oropharynx inflammation present, tonsils within normal limits  · Neck  o Inspection/Palpation  o : normal appearance, no masses or tenderness, trachea midline  o Range of Motion  o : cervical range of motion within normal limits  o Thyroid  o : gland size normal, nontender, no nodules or masses present on palpation  · Respiratory  o Respiratory Effort  o : breathing unlabored  o Inspection of Chest  o : normal appearance  o Auscultation of Lungs  o : moderate rales present-right-middle lobe left-upper lobe   · Cardiovascular  o Heart  o : "   § Auscultation of Heart  § : regular rate and rhythm, no murmurs, gallops or rubs  o Peripheral Vascular System  o :   § Carotid Arteries  § : normal pulses bilaterally, no bruits present  · Gastrointestinal  o Abdominal Examination  o : abdomen nontender to palpation, tone normal without rigidity or guarding, no masses present, bowel sounds present  · Skin and Subcutaneous Tissue  o General Inspection  o : no rashes or lesions present, no areas of discoloration  o Body Hair  o : hair normal for age, general body hair distribution normal for age  o Digits and Nails  o : no clubbing, cyanosis, deformities or edema present, normal appearing nails  · Neurologic  o Mental Status Examination  o :   § Orientation  § : grossly oriented to person, place and time  o Gait and Station  o : normal gait, able to stand without difficulty  · Psychiatric  o Judgement and Insight  o : judgment and insight intact  o Mood and Affect  o : mood normal, affect appropriate          Assessment  · Cough     786.2/R05  · Pneumonia     486/J18.9  · Shortness of breath     786.05/R06.02  · History of cardiac pacemaker       Presence of cardiac pacemaker     V12.50/Z95.0  · COVID-19 virus infection       COVID-19     519.8/U07.1      Plan  · Orders  o Chest xray 2 views [facility] ([id number]) - 786.2/R05 - 10/29/2020  o ACO-39: Current medications updated and reviewed (, 1159F) - - 10/29/2020  o [facility] NCOV2 (send-out) (11094) - - 10/29/2020  · Medications  o Zithromax Z-Saleem 250 mg oral tablet   SIG: take 2 tablets (500 mg) by oral route once daily for 1 day then 1 tablet (250 mg) by oral route once daily for 4 days   DISP: (6) Tablet with 0 refills  Prescribed on 10/29/2020     o Medrol (Saleem) 4 mg oral tablets,dose pack   SIG: take as directed for 6 days   DISP: (1) Package with 0 refills  Prescribed on 10/29/2020     o Ventolin HFA 90 mcg/actuation inhalation HFA aerosol inhaler   SIG: inhale 1-2 puff by inhalation route every 4 to 6  hours as needed for 30 days   DISP: (1) Inhaler with 0 refills  Prescribed on 10/29/2020     o Lexapro 10 mg oral tablet   SIG: take 1 tablet by oral route once a day (at bedtime) for 90 days   DISP: (90) Tablet with 1 refills  Refilled on 10/29/2020     o Medications have been Reconciled  o Transition of Care or Provider Policy  · Instructions  o Rest. Increase Fluids.  o Patient was educated/instructed on their diagnosis, treatment and medications prior to discharge from the clinic today.  o Patient instructed to seek medical attention urgently for new or worsening symptoms.  o Call the office with any concerns or questions.  · Disposition  o Call or Return if symptoms worsen or persist.            Electronically Signed by: SHELDON Ennis -Author on October 29, 2020 03:55:56 PM

## 2021-05-13 NOTE — PROGRESS NOTES
Progress Note      Patient Name: Kingston Hope   Patient ID: [id number]   Sex: Male   YOB: 1970        Visit Date: November 16, 2020    Provider: SHELDON Ennis   Location: [facility]   Location Address: [address] [place] [address]  [id number]   Location Phone: [phone]          Chief Complaint  · 6 Month Follow up for Anxiety, Migraine Headaches and Lumbago      History Of Present Illness  Kingston Hope is a 50 year old /White male who presents for evaluation and treatment of:      6 Month Follow up for Anxiety, Migraine Headaches and Lumbago  Last lab work done 5/19/20  Med refills needed    Pt c/o continued dry cough. Zyrtec and Flonase seem to dry him out but not help with cough.  Will walk down the drive way and feel like he ran a mile, feeling short of breath.    Tried Imitrex 1 time and did not like the way he felt.     Started Turmeric and B12 OTC    Pt admits to snoring, daytime somnolence. Pt reports awakening with h/a most days.     flu shot- declined       Past Medical History  Disease Name Date Onset Notes   Anxiety --  --    Chronic back pain --  --    Diverticulitis --  --    Head injury --  Previous   Heart Murmur --  --    Hemorrhoids --  --    Hepatitis --  As a child, was hospitalized   Lumbago --  --    Migraine headache --  --    Pacemaker 04/15/2020 Sees [facility] cardiology.         Past Surgical History  Procedure Name Date Notes   heart surgery --  --    Pacemaker 1998 Weak Sinus Node   Pacemaker. --  --          Medication List  Name Date Started Instructions   cetirizine 10 mg oral tablet 07/23/2020 take 1 tablet by oral route once a day (at bedtime)   compound cream 07/23/2020 apply nightly   fluticasone propionate 50 mcg/actuation nasal spray,suspension 07/23/2020 spray 1 - 2 sprays in each nostril by intranasal route once daily   Lexapro 10 mg oral tablet 10/29/2020 take 1 tablet by oral route once a  day (at bedtime) for 90 days   turmeric 400 mg oral capsule  take 1 capsule by oral route daily   Ventolin HFA 90 mcg/actuation inhalation HFA aerosol inhaler 10/29/2020 inhale 1-2 puff by inhalation route every 4 to 6 hours as needed for 30 days   Vitamin B-12 1,000 mcg oral tablet  take 1 tablet by oral route daily         Allergy List  Allergen Name Date Reaction Notes   NSAIDS --  --  --          Family Medical History  Disease Name Relative/Age Notes   Family history of brain cancer  --    Family history of stroke Mother/   --    Family history of Alzheimer's disease  --    Family history of lupus erythematosus  --    Family history of hearing loss  --          Social History  Finding Status Start/Stop Quantity Notes   Alcohol Never --/-- --  --    lives with children --  --/-- --  --    lives with spouse --  --/-- --  --    . --  --/-- --  --    Recreational Drug Use Never --/-- --  no   Tobacco Never --/-- --  never smoker   Working --  --/-- --  --          Immunizations  NameDate Admin Mfg Trade Name Lot Number Route Inj VIS Given VIS Publication   InfluenzaRefused 04/15/2020 NE Not Entered  NE NE     Comments: Pt declined         Review of Systems  · Constitutional  o Denies  o : fatigue, fever, weight gain, weight loss, chills  · Cardiovascular  o Denies  o : chest Pain, palpitations, edema (swelling)  · Respiratory  o Admits  o : frequent cough, shortness of breath  · Gastrointestinal  o Admits  o : reflux/heartburn  o Denies  o : nausea, vomiting, changes in bowel habits  · Genitourinary  o Denies  o : dysuria, urinary frequency, urinary urgency, polyuria  · Neurologic  o Admits  o : headache  o Denies  o : tingling or numbness, dizziness  · Endocrine  o Denies  o : polydipsia, polyphagia  · Psychiatric  o Admits  o : sleep distrubances  o Denies  o : mood changes, memory changes, SI/HI  · Allergic-Immunologic  o Admits  o : seasonal allergies  o Denies  o : eczema, urticaria      Vitals  Date  "Time BP Position Site L\R Cuff Size HR RR TEMP (F) WT  HT  BMI kg/m2 BSA m2 O2 Sat FR L/min FiO2 HC       07/23/2020 08:28 /71 Sitting    79 - R 18 97.9 231lbs 6oz 5'  10\" 33.2 2.28 97 %      10/29/2020 01:50 /84 Sitting    83 - R 24 98.7 235lbs 8oz 5'  10\" 33.79 2.3 94 %      11/16/2020 02:50 /80 Sitting    97 - R 18 98.2 236lbs 0oz 5'  10\" 33.86 2.3 98 %            Physical Examination  · Constitutional  o Appearance  o : well-nourished, in no acute distress  · Eyes  o Conjunctivae  o : conjunctiva appears injected   o Sclerae  o : sclerae white  o Pupils and Irises  o : pupils equal and round  o Eyelids/Ocular Adnexae  o : eyelid appearance normal, no exudates present  · Neck  o Inspection/Palpation  o : normal appearance, no masses or tenderness, trachea midline  o Range of Motion  o : cervical range of motion within normal limits  o Thyroid  o : gland size normal, nontender, no nodules or masses present on palpation  · Respiratory  o Respiratory Effort  o : breathing unlabored  o Inspection of Chest  o : normal appearance  o Auscultation of Lungs  o : diminished breath sounds present -throughout   · Cardiovascular  o Heart  o :   § Auscultation of Heart  § : regular rate and rhythm, no murmurs, gallops or rubs  o Peripheral Vascular System  o :   § Carotid Arteries  § : normal pulses bilaterally, no bruits present  § Extremities  § : no clubbing or edema  · Gastrointestinal  o Abdominal Examination  o : abdomen nontender to palpation, tone normal without rigidity or guarding, no masses present, bowel sounds present  · Skin and Subcutaneous Tissue  o General Inspection  o : no rashes or lesions present, no areas of discoloration  o Body Hair  o : hair normal for age, general body hair distribution normal for age  o Digits and Nails  o : no clubbing, cyanosis, deformities or edema present, normal appearing nails  · Neurologic  o Mental Status Examination  o :   § Orientation  § : grossly " oriented to person, place and time  o Gait and Station  o : normal gait, able to stand without difficulty  · Psychiatric  o Judgement and Insight  o : judgment and insight intact  o Mood and Affect  o : mood normal, affect appropriate          Assessment  · Allergic rhinitis due to allergen     477.9/J30.9  · Anxiety disorder     300.00/F41.9  · Cough     786.2/R05  · GERD (gastroesophageal reflux disease)     530.81/K21.9  · Lumbago     724.2/M54.5  · Migraine     346.90/G43.909  · Snoring     786.09/R06.83  · Daytime somnolence     780.54/R40.0  · Shortness of breath     786.05/R06.02      Plan  · Orders  o ACO-39: Current medications updated and reviewed (1159F, ) - - 11/16/2020  o ACO-14: Influenza immunization was not administered for reasons documented [facility] () - - 11/16/2020  o Sleep Disorder Clinic Consultation (SLEEP) - - 11/16/2020  · Medications  o omeprazole 20 mg oral tablet,delayed release (DR/EC)   SIG: take 1 tablet by oral route daily for 90 days   DISP: (90) Tablet with 1 refills  Prescribed on 11/16/2020     o Singulair 10 mg oral tablet   SIG: take 1 tablet (10 mg) by oral route once daily in the evening for 30 days   DISP: (30) Tablet with 1 refills  Prescribed on 11/16/2020     o Imitrex 50 mg oral tablet   SIG: take 1 tab with fluids at onset of a migraine attack;may repeat after 2 hours if headache returns, not to exceed 200mg in 24hrs.   DISP: (14) tablets with 0 refills  Discontinued on 11/16/2020     o Tylenol Arthritis Pain 650 mg oral tablet extended release   SIG: take 2 tablets (1,300 mg) by oral route every 8 hours swallowing whole with water. Do not break, crush, dissolve and/or chew. for 30 days   DISP: (180) tablets with 0 refills  Discontinued on 11/16/2020     o Medications have been Reconciled  o Transition of Care or Provider Policy  · Instructions  o Patient was given an SSRI/SSNRI medication and warned of possible side effects of the medication including potential for  increased risk of suicidal thoughts and feelings. Patient was instructed that if they begin to exhibit any of these effects they will discontinue the medication immediately and contact our office or the ER ASAP.  o Maintain a healthy weight. Avoid tight fitting clothes. Avoid fried, fatty foods, tomato sauce, chocolate, mint, garlic, onion, alcohol. caffeine. Eat smaller meals, dont lie down after a meal, dont smoke. Elevate the head of your bed 6-9 inches.  o Patient was educated/instructed on their diagnosis, treatment and medications prior to discharge from the clinic today.  o Call the office with any concerns or questions.  · Disposition  o Follow up in 4-6 weeks            Electronically Signed by: SHELDON Ennis -Author on November 16, 2020 03:12:22 PM

## 2021-05-14 ENCOUNTER — HOSPITAL ENCOUNTER (OUTPATIENT)
Dept: HOSPITAL 49 - FAS | Age: 51
Discharge: HOME | End: 2021-05-14
Attending: SURGERY
Payer: COMMERCIAL

## 2021-05-14 VITALS
OXYGEN SATURATION: 98 % | DIASTOLIC BLOOD PRESSURE: 80 MMHG | BODY MASS INDEX: 33.79 KG/M2 | SYSTOLIC BLOOD PRESSURE: 125 MMHG | HEART RATE: 97 BPM | TEMPERATURE: 98.2 F | HEIGHT: 70 IN | WEIGHT: 236 LBS | RESPIRATION RATE: 18 BRPM

## 2021-05-14 VITALS
DIASTOLIC BLOOD PRESSURE: 84 MMHG | BODY MASS INDEX: 33.71 KG/M2 | HEIGHT: 70 IN | SYSTOLIC BLOOD PRESSURE: 121 MMHG | WEIGHT: 235.5 LBS | OXYGEN SATURATION: 94 % | HEART RATE: 83 BPM | RESPIRATION RATE: 24 BRPM | TEMPERATURE: 98.7 F

## 2021-05-14 VITALS — HEIGHT: 70 IN | BODY MASS INDEX: 33.21 KG/M2 | WEIGHT: 231.99 LBS

## 2021-05-14 DIAGNOSIS — K81.1: Primary | ICD-10-CM

## 2021-05-14 DIAGNOSIS — Z88.5: ICD-10-CM

## 2021-05-14 DIAGNOSIS — K82.8: ICD-10-CM

## 2021-05-14 LAB
ALBUMIN SERPL-MCNC: 4 G/DL (ref 3.4–5)
ALKALINE PHOSHATASE: 72 U/L (ref 46–116)
ALT SERPL-CCNC: 37 U/L (ref 16–63)
AST: 27 U/L (ref 15–37)
BILIRUBIN - TOTAL: 1 MG/DL (ref 0.2–1)
BUN SERPL-MCNC: 12 MG/DL (ref 7–18)
BUN/CREAT RATIO (CALC): 16 RATIO
CHLORIDE: 106 MMOL/L (ref 98–107)
CO2 (BICARBONATE): 25 MMOL/L (ref 21–32)
CREATININE: 0.75 MG/DL (ref 0.67–1.17)
GLOBULIN (CALCULATION): 3.5 G/DL
GLUCOSE SERPL-MCNC: 90 MG/DL (ref 74–106)
HCT: 44.6 % (ref 42–52)
HGB BLD-MCNC: 16 G/DL (ref 13.2–18)
MCH RBC QN AUTO: 31.9 PG (ref 25–31)
MCHC RBC AUTO-ENTMCNC: 35.9 G/DL (ref 32–36)
MCV: 88.8 FL (ref 78–100)
MPV: 9.1 FL (ref 6–9.5)
PLT: 137 K/UL (ref 150–400)
POTASSIUM: 4.3 MMOL/L (ref 3.5–5.1)
RBC: 5.02 M/UL (ref 4.7–6)
RDW: 12 % (ref 11.5–14)
TOTAL PROTEIN: 7.5 G/DL (ref 6.4–8.2)
WBC: 5.1 K/UL (ref 4–10.5)

## 2021-05-14 PROCEDURE — C1758 CATHETER, URETERAL: HCPCS

## 2021-05-15 VITALS
SYSTOLIC BLOOD PRESSURE: 107 MMHG | TEMPERATURE: 97.9 F | RESPIRATION RATE: 18 BRPM | WEIGHT: 231.37 LBS | BODY MASS INDEX: 33.12 KG/M2 | OXYGEN SATURATION: 97 % | HEIGHT: 70 IN | DIASTOLIC BLOOD PRESSURE: 71 MMHG | HEART RATE: 79 BPM

## 2021-05-15 VITALS — WEIGHT: 236 LBS | BODY MASS INDEX: 33.79 KG/M2 | HEIGHT: 70 IN | TEMPERATURE: 96.7 F

## 2021-05-15 VITALS — HEIGHT: 70 IN | WEIGHT: 226 LBS | BODY MASS INDEX: 32.35 KG/M2

## 2021-08-05 ENCOUNTER — HOSPITAL ENCOUNTER (EMERGENCY)
Dept: HOSPITAL 49 - FER | Age: 51
Discharge: HOME | End: 2021-08-05
Payer: COMMERCIAL

## 2021-08-05 DIAGNOSIS — Z90.49: ICD-10-CM

## 2021-08-05 DIAGNOSIS — Z95.0: ICD-10-CM

## 2021-08-05 DIAGNOSIS — R10.84: ICD-10-CM

## 2021-08-05 DIAGNOSIS — U07.1: Primary | ICD-10-CM

## 2021-08-05 DIAGNOSIS — Z88.5: ICD-10-CM

## 2021-08-05 DIAGNOSIS — J12.82: ICD-10-CM

## 2021-08-05 LAB
ALBUMIN SERPL-MCNC: 3.3 G/DL (ref 3.4–5)
ALKALINE PHOSHATASE: 60 U/L (ref 46–116)
ALT SERPL-CCNC: 32 U/L (ref 16–63)
AST: 37 U/L (ref 15–37)
BACTERIA: (no result)
BASOPHIL: 0.3 % (ref 0–2)
BILIRUBIN - TOTAL: 0.7 MG/DL (ref 0.2–1)
BILIRUBIN: NEGATIVE MG/DL
BLOOD: NEGATIVE ERY/UL
BUN SERPL-MCNC: 10 MG/DL (ref 7–18)
BUN/CREAT RATIO (CALC): 11.2 RATIO
CHLORIDE: 96 MMOL/L (ref 98–107)
CLARITY UR: CLEAR
CO2 (BICARBONATE): 26 MMOL/L (ref 21–32)
COLOR: YELLOW
CREATININE: 0.89 MG/DL (ref 0.67–1.17)
EOSINOPHIL: 0.5 % (ref 0–5)
GLOBULIN (CALCULATION): 3.8 G/DL
GLUCOSE (U): NORMAL MG/DL
GLUCOSE SERPL-MCNC: 93 MG/DL (ref 74–106)
HCT: 41.9 % (ref 42–52)
HGB BLD-MCNC: 14.9 G/DL (ref 13.2–18)
LEUKOCYTES: NEGATIVE LEU/UL
LYMPHOCYTE: 21.2 % (ref 15–48)
MCH RBC QN AUTO: 31.2 PG (ref 25–31)
MCHC RBC AUTO-ENTMCNC: 35.6 G/DL (ref 32–36)
MCV: 87.7 FL (ref 78–100)
MONOCYTE: 10.7 % (ref 0–12)
MPV: 8.5 FL (ref 6–9.5)
NEUTROPHIL: 67 % (ref 41–80)
NITRITE: NEGATIVE MG/DL
NRBC: 0
PLT: 173 K/UL (ref 150–400)
POTASSIUM: 3.5 MMOL/L (ref 3.5–5.1)
PROTEIN: (no result) MG/DL
RBC MORPHOLOGY: NORMAL
RBC: 4.78 M/UL (ref 4.7–6)
RDW: 11.9 % (ref 11.5–14)
SPECIFIC GRAVITY: 1.01 (ref 1–1.03)
TOTAL PROTEIN: 7.1 G/DL (ref 6.4–8.2)
URINARY WBC: (no result)
UROBILINOGEN: 0.2 MG/DL (ref 0.2–1)
WBC: 3.6 K/UL (ref 4–10.5)

## 2021-10-15 ENCOUNTER — TELEPHONE (OUTPATIENT)
Dept: CARDIOLOGY | Facility: CLINIC | Age: 51
End: 2021-10-15

## 2021-10-15 ENCOUNTER — CLINICAL SUPPORT NO REQUIREMENTS (OUTPATIENT)
Dept: CARDIOLOGY | Facility: CLINIC | Age: 51
End: 2021-10-15

## 2021-10-15 DIAGNOSIS — I49.5 SICK SINUS SYNDROME (HCC): Primary | ICD-10-CM

## 2021-10-15 PROCEDURE — 93280 PM DEVICE PROGR EVAL DUAL: CPT | Performed by: INTERNAL MEDICINE

## 2021-10-15 NOTE — TELEPHONE ENCOUNTER
Pt with dual chamber [employer] [employer] pacemaker in office yearly visit 10/15/21. Pt stated he recently started cycling and was concerned about his pacemaker affecting ability to increase heart rate.    Pt paces <1% in both atrium and ventricle. Spoke with Dakotah Metcalf during visit and he agreed that pacemaker was probably not inhibiting pt ability to raise HR during activity. Rep recommended changing HVR timing interval from 5 cycles to 15 cycles to prevent frequent notifications due to high heart rates from increased activity. Detection rate remains unchanged @ 175 bpm.     Just wanted to make you aware of changes!

## 2021-11-17 PROCEDURE — 93296 REM INTERROG EVL PM/IDS: CPT | Performed by: INTERNAL MEDICINE

## 2021-11-17 PROCEDURE — 93294 REM INTERROG EVL PM/LDLS PM: CPT | Performed by: INTERNAL MEDICINE

## 2022-02-16 PROCEDURE — 93294 REM INTERROG EVL PM/LDLS PM: CPT | Performed by: INTERNAL MEDICINE

## 2022-02-16 PROCEDURE — 93296 REM INTERROG EVL PM/IDS: CPT | Performed by: INTERNAL MEDICINE

## 2022-05-18 PROCEDURE — 93294 REM INTERROG EVL PM/LDLS PM: CPT | Performed by: INTERNAL MEDICINE

## 2022-05-18 PROCEDURE — 93296 REM INTERROG EVL PM/IDS: CPT | Performed by: INTERNAL MEDICINE

## 2022-06-13 ENCOUNTER — OFFICE VISIT (OUTPATIENT)
Dept: ORTHOPEDIC SURGERY | Facility: CLINIC | Age: 52
End: 2022-06-13

## 2022-06-13 VITALS — HEIGHT: 70 IN | BODY MASS INDEX: 34.9 KG/M2 | HEART RATE: 87 BPM | OXYGEN SATURATION: 96 % | WEIGHT: 243.8 LBS

## 2022-06-13 DIAGNOSIS — M65.312 TRIGGER FINGER OF LEFT THUMB: Primary | ICD-10-CM

## 2022-06-13 DIAGNOSIS — G56.02 CARPAL TUNNEL SYNDROME OF LEFT WRIST: ICD-10-CM

## 2022-06-13 PROCEDURE — 99203 OFFICE O/P NEW LOW 30 MIN: CPT | Performed by: STUDENT IN AN ORGANIZED HEALTH CARE EDUCATION/TRAINING PROGRAM

## 2022-06-13 PROCEDURE — 20550 NJX 1 TENDON SHEATH/LIGAMENT: CPT | Performed by: STUDENT IN AN ORGANIZED HEALTH CARE EDUCATION/TRAINING PROGRAM

## 2022-06-13 RX ADMIN — LIDOCAINE HYDROCHLORIDE 1 ML: 10 INJECTION, SOLUTION INFILTRATION; PERINEURAL at 13:16

## 2022-06-13 RX ADMIN — TRIAMCINOLONE ACETONIDE 40 MG: 40 INJECTION, SUSPENSION INTRA-ARTICULAR; INTRAMUSCULAR at 13:16

## 2022-06-13 NOTE — PROGRESS NOTES
"Chief Complaint  Pain of the Left Hand and Pain of the Left Wrist    Subjective          Kingston Samuel Hope presents to [facility] for   History of Present Illness    The patient presents here today for evaluation of the left wrist and hand. He reports pain to the thumb area with locking of the thumb. He has tried a thumb brace. He reports his thumb started having symptoms about 1 month ago.   Allergies   Allergen Reactions   • Morphine Nausea Only   • Meloxicam Rash        Social History     Socioeconomic History   • Marital status:    Tobacco Use   • Smoking status: Never Smoker   • Smokeless tobacco: Never Used   Vaping Use   • Vaping Use: Never used   Substance and Sexual Activity   • Alcohol use: No   • Drug use: Never        I reviewed the patient's chief complaint, history of present illness, review of systems, past medical history, surgical history, family history, social history, medications, and allergy list.     REVIEW OF SYSTEMS    Constitutional: Denies fevers, chills, weight loss  Cardiovascular: Denies chest pain, shortness of breath  Skin: Denies rashes, acute skin changes  Neurologic: Denies headache, loss of consciousness  MSK: left hand/wrist pain      Objective   Vital Signs:   Pulse 87   Ht 177.8 cm (70\")   Wt 111 kg (243 lb 12.8 oz)   SpO2 96%   BMI 34.98 kg/m²     Body mass index is 34.98 kg/m².    Physical Exam    General: Alert. No acute distress.   Left hand/wrist- No wounds. No muscle atrophy. Non-tender to lateral epicondyle. 60 wrist flexion and extension. Full finger ROM. Intact thumb opposition and andrews Abduction. Tender over the thumb A-1 pulley. Triggering with thumb ROM. Positive Phalen compression over the carpal tunnel. Negative CMC grind testing. Negative Tinel at the elbow.  Otherwise neurovascularly intact.    Small Joint Arthrocentesis: L thumb IP  Consent given by: patient  Site marked: site marked  Timeout: Immediately " prior to procedure a time out was called to verify the correct patient, procedure, equipment, support staff and site/side marked as required   Supporting Documentation  Indications: pain   Procedure Details  Location: thumb - L thumb IP  Preparation: Patient was prepped and draped in the usual sterile fashion  Needle size: 25 G  Medications administered: 1 mL lidocaine 1 %; 40 mg triamcinolone acetonide 40 MG/ML  Patient tolerance: patient tolerated the procedure well with no immediate complications          Imaging Results (Most Recent)     None                   Assessment and Plan        No results found.     Diagnoses and all orders for this visit:    1. Trigger finger of left thumb (Primary)    2. Carpal tunnel syndrome of left wrist    Other orders  -     Small Joint Arthrocentesis: L thumb IP        Discussed the treatment plan with the patient.  Discussed the risks and benefits of a left thumb trigger thumb injection. The patient expressed understanding and wished to proceed. He tolerated the injection well. Plan to continue carpal tunnel brace at night in gentle finger range of motion exercises.. Plan for OTC NSAIDS. May consider EMG if symptoms persist.       Call or return if worsening symptoms.    Scribed for [place] Velez MD by Rona Floyd  06/13/2022   13:07 EDT         Follow Up   Return in about 6 weeks (around 7/25/2022).  Patient was given instructions and counseling regarding his condition or for health maintenance advice. Please see specific information pulled into the AVS if appropriate.       I have personally performed the services described in this document as scribed by the above individual and it is both accurate and complete.     Mt Velez MD  06/14/22  17:09 EDT

## 2022-06-14 RX ORDER — LIDOCAINE HYDROCHLORIDE 10 MG/ML
1 INJECTION, SOLUTION INFILTRATION; PERINEURAL
Status: COMPLETED | OUTPATIENT
Start: 2022-06-13 | End: 2022-06-13

## 2022-06-14 RX ORDER — TRIAMCINOLONE ACETONIDE 40 MG/ML
40 INJECTION, SUSPENSION INTRA-ARTICULAR; INTRAMUSCULAR
Status: COMPLETED | OUTPATIENT
Start: 2022-06-13 | End: 2022-06-13

## 2022-06-15 DIAGNOSIS — M25.50 POLYARTHRALGIA: Primary | ICD-10-CM

## 2022-07-25 ENCOUNTER — OFFICE VISIT (OUTPATIENT)
Dept: ORTHOPEDIC SURGERY | Facility: CLINIC | Age: 52
End: 2022-07-25

## 2022-07-25 VITALS — BODY MASS INDEX: 34.5 KG/M2 | HEART RATE: 84 BPM | HEIGHT: 70 IN | WEIGHT: 241 LBS | OXYGEN SATURATION: 98 %

## 2022-07-25 DIAGNOSIS — M65.312 TRIGGER FINGER OF LEFT THUMB: Primary | ICD-10-CM

## 2022-07-25 DIAGNOSIS — G56.02 CARPAL TUNNEL SYNDROME OF LEFT WRIST: ICD-10-CM

## 2022-07-25 PROCEDURE — 99213 OFFICE O/P EST LOW 20 MIN: CPT | Performed by: STUDENT IN AN ORGANIZED HEALTH CARE EDUCATION/TRAINING PROGRAM

## 2022-07-25 NOTE — PROGRESS NOTES
"Chief Complaint  Pain and Follow-up of the Left Hand    Subjective          Kingston Hope presents to [facility] for   History of Present Illness    The patient presents here today for follow up evaluation of the left hand. The patient has been treating his trigger thumb conservatively. He received a trigger thumb injection at his last appointment. He reports he still has some pain and tightness. He states the triggering and locking has resolved. He has decreased  strength. He has a family history of lupus and has all over joint pain. He is interested in getting evaluated for this.     Allergies   Allergen Reactions   • Morphine Nausea Only   • Meloxicam Rash        Social History     Socioeconomic History   • Marital status:    Tobacco Use   • Smoking status: Never Smoker   • Smokeless tobacco: Never Used   Vaping Use   • Vaping Use: Never used   Substance and Sexual Activity   • Alcohol use: No   • Drug use: Never        I reviewed the patient's chief complaint, history of present illness, review of systems, past medical history, surgical history, family history, social history, medications, and allergy list.     REVIEW OF SYSTEMS    Constitutional: Denies fevers, chills, weight loss  Cardiovascular: Denies chest pain, shortness of breath  Skin: Denies rashes, acute skin changes  Neurologic: Denies headache, loss of consciousness  MSK: Left hand pain      Objective   Vital Signs:   Pulse 84   Ht 177.8 cm (70\")   Wt 109 kg (241 lb)   SpO2 98%   BMI 34.58 kg/m²     Body mass index is 34.58 kg/m².    Physical Exam    General: Alert. No acute distress.   Left hand- tender to the A-1 pulley at base of the thumb. palpable nodule. No triggering or locking. Full finger ROM. Sensation intact to light touch median, radial, ulnar nerve. Positive AIN, PIN, ulnar nerve motor function. Positive pulses. Positive Phalen compression testing over the carpal tunnel. "     Procedures    Imaging Results (Most Recent)     None                   Assessment and Plan        No results found.     Diagnoses and all orders for this visit:    1. Trigger finger of left thumb (Primary)    2. Carpal tunnel syndrome of left wrist        Discussed the treatment plan with the patient.  Plan to continue conservative treatment. I advised him to see his PCP about his all over joint pain and being evaluated for lupus. Plan to continue carpal tunnel brace as needed.  May consider EMG if carpal tunnel symptoms persist.     Call or return if worsening symptoms.    Scribed for Mt Velez MD by Rona Floyd  07/25/2022   13:01 EDT         Follow Up   Return if symptoms worsen or fail to improve.  Patient was given instructions and counseling regarding his condition or for health maintenance advice. Please see specific information pulled into the AVS if appropriate.       I have personally performed the services described in this document as scribed by the above individual and it is both accurate and complete.     Mt Velez MD  07/25/22  13:08 EDT

## 2022-09-04 PROCEDURE — 93296 REM INTERROG EVL PM/IDS: CPT | Performed by: INTERNAL MEDICINE

## 2022-09-04 PROCEDURE — 93294 REM INTERROG EVL PM/LDLS PM: CPT | Performed by: INTERNAL MEDICINE

## 2022-09-06 ENCOUNTER — TELEPHONE (OUTPATIENT)
Dept: CARDIOLOGY | Facility: CLINIC | Age: 52
End: 2022-09-06

## 2022-09-06 NOTE — TELEPHONE ENCOUNTER
Patient with DDD pacer implanted for syncope in 1998, last device change 10/12/2010  remote transmission reviewed today documented RRT reached on 8/13/22. Patient not dependent A/V paces < 1 % of the time. Not on OAC. Last office visit with you  ( Dr. Meeks ) was on 3/12/20, he doesn't see any other cardiologist.     I spoke with patient today and updated him on the status of his device  reaching RRT and that scheduling would be calling him.     Device, leads and thresholds:

## 2022-09-07 DIAGNOSIS — R55 SYNCOPE, UNSPECIFIED SYNCOPE TYPE: Primary | ICD-10-CM

## 2022-09-08 ENCOUNTER — TRANSCRIBE ORDERS (OUTPATIENT)
Dept: CARDIOLOGY | Facility: CLINIC | Age: 52
End: 2022-09-08

## 2022-09-08 DIAGNOSIS — Z13.6 SCREENING FOR ISCHEMIC HEART DISEASE: ICD-10-CM

## 2022-09-08 DIAGNOSIS — Z01.810 PRE-OPERATIVE CARDIOVASCULAR EXAMINATION: Primary | ICD-10-CM

## 2022-09-19 ENCOUNTER — LAB (OUTPATIENT)
Dept: LAB | Facility: HOSPITAL | Age: 52
End: 2022-09-19

## 2022-09-19 DIAGNOSIS — Z13.6 SCREENING FOR ISCHEMIC HEART DISEASE: ICD-10-CM

## 2022-09-19 DIAGNOSIS — Z01.810 PRE-OPERATIVE CARDIOVASCULAR EXAMINATION: ICD-10-CM

## 2022-09-19 PROCEDURE — 80048 BASIC METABOLIC PNL TOTAL CA: CPT

## 2022-09-19 PROCEDURE — 36415 COLL VENOUS BLD VENIPUNCTURE: CPT

## 2022-09-19 PROCEDURE — 85025 COMPLETE CBC W/AUTO DIFF WBC: CPT

## 2022-09-20 LAB
ANION GAP SERPL CALCULATED.3IONS-SCNC: 9.5 MMOL/L (ref 5–15)
BASOPHILS # BLD AUTO: 0.05 10*3/MM3 (ref 0–0.2)
BASOPHILS NFR BLD AUTO: 1 % (ref 0–1.5)
BUN SERPL-MCNC: 8 MG/DL (ref 6–20)
BUN/CREAT SERPL: 8.4 (ref 7–25)
CALCIUM SPEC-SCNC: 9.3 MG/DL (ref 8.6–10.5)
CHLORIDE SERPL-SCNC: 100 MMOL/L (ref 98–107)
CO2 SERPL-SCNC: 27.5 MMOL/L (ref 22–29)
CREAT SERPL-MCNC: 0.95 MG/DL (ref 0.76–1.27)
DEPRECATED RDW RBC AUTO: 45.8 FL (ref 37–54)
EGFRCR SERPLBLD CKD-EPI 2021: 96.3 ML/MIN/1.73
EOSINOPHIL # BLD AUTO: 0.28 10*3/MM3 (ref 0–0.4)
EOSINOPHIL NFR BLD AUTO: 5.7 % (ref 0.3–6.2)
ERYTHROCYTE [DISTWIDTH] IN BLOOD BY AUTOMATED COUNT: 12.9 % (ref 12.3–15.4)
GLUCOSE SERPL-MCNC: 115 MG/DL (ref 65–99)
HCT VFR BLD AUTO: 48.1 % (ref 37.5–51)
HGB BLD-MCNC: 16 G/DL (ref 13–17.7)
IMM GRANULOCYTES # BLD AUTO: 0.02 10*3/MM3 (ref 0–0.05)
IMM GRANULOCYTES NFR BLD AUTO: 0.4 % (ref 0–0.5)
LYMPHOCYTES # BLD AUTO: 1.32 10*3/MM3 (ref 0.7–3.1)
LYMPHOCYTES NFR BLD AUTO: 26.7 % (ref 19.6–45.3)
MCH RBC QN AUTO: 32.1 PG (ref 26.6–33)
MCHC RBC AUTO-ENTMCNC: 33.3 G/DL (ref 31.5–35.7)
MCV RBC AUTO: 96.4 FL (ref 79–97)
MONOCYTES # BLD AUTO: 0.32 10*3/MM3 (ref 0.1–0.9)
MONOCYTES NFR BLD AUTO: 6.5 % (ref 5–12)
NEUTROPHILS NFR BLD AUTO: 2.96 10*3/MM3 (ref 1.7–7)
NEUTROPHILS NFR BLD AUTO: 59.7 % (ref 42.7–76)
NRBC BLD AUTO-RTO: 0 /100 WBC (ref 0–0.2)
PLATELET # BLD AUTO: 144 10*3/MM3 (ref 140–450)
PMV BLD AUTO: 9.9 FL (ref 6–12)
POTASSIUM SERPL-SCNC: 4.3 MMOL/L (ref 3.5–5.2)
RBC # BLD AUTO: 4.99 10*6/MM3 (ref 4.14–5.8)
SODIUM SERPL-SCNC: 137 MMOL/L (ref 136–145)
WBC NRBC COR # BLD: 4.95 10*3/MM3 (ref 3.4–10.8)

## 2022-09-21 ENCOUNTER — HOSPITAL ENCOUNTER (OUTPATIENT)
Facility: HOSPITAL | Age: 52
Setting detail: HOSPITAL OUTPATIENT SURGERY
Discharge: HOME | End: 2022-09-21
Attending: INTERNAL MEDICINE | Admitting: INTERNAL MEDICINE

## 2022-09-21 VITALS
HEART RATE: 76 BPM | BODY MASS INDEX: 33.5 KG/M2 | HEIGHT: 70 IN | RESPIRATION RATE: 18 BRPM | TEMPERATURE: 97.7 F | OXYGEN SATURATION: 99 % | DIASTOLIC BLOOD PRESSURE: 100 MMHG | SYSTOLIC BLOOD PRESSURE: 135 MMHG | WEIGHT: 234 LBS

## 2022-09-21 DIAGNOSIS — R55 SYNCOPE, UNSPECIFIED SYNCOPE TYPE: ICD-10-CM

## 2022-09-21 LAB — QT INTERVAL: 400 MS

## 2022-09-21 PROCEDURE — 99152 MOD SED SAME PHYS/QHP 5/>YRS: CPT | Performed by: INTERNAL MEDICINE

## 2022-09-21 PROCEDURE — 93005 ELECTROCARDIOGRAM TRACING: CPT | Performed by: INTERNAL MEDICINE

## 2022-09-21 PROCEDURE — 0 LIDOCAINE 1 % SOLUTION: Performed by: INTERNAL MEDICINE

## 2022-09-21 PROCEDURE — 25010000002 CEFAZOLIN 1-4 GM/50ML-% SOLUTION: Performed by: INTERNAL MEDICINE

## 2022-09-21 PROCEDURE — 33228 REMV&REPLC PM GEN DUAL LEAD: CPT | Performed by: INTERNAL MEDICINE

## 2022-09-21 PROCEDURE — S0260 H&P FOR SURGERY: HCPCS | Performed by: INTERNAL MEDICINE

## 2022-09-21 PROCEDURE — C1785 PMKR, DUAL, RATE-RESP: HCPCS | Performed by: INTERNAL MEDICINE

## 2022-09-21 PROCEDURE — 25010000002 FENTANYL CITRATE (PF) 50 MCG/ML SOLUTION: Performed by: INTERNAL MEDICINE

## 2022-09-21 PROCEDURE — 93010 ELECTROCARDIOGRAM REPORT: CPT | Performed by: INTERNAL MEDICINE

## 2022-09-21 PROCEDURE — 25010000002 MIDAZOLAM PER 1 MG: Performed by: INTERNAL MEDICINE

## 2022-09-21 PROCEDURE — 99153 MOD SED SAME PHYS/QHP EA: CPT | Performed by: INTERNAL MEDICINE

## 2022-09-21 DEVICE — GEN PM ASSURITY MRI DR RF PM2272: Type: IMPLANTABLE DEVICE | Status: FUNCTIONAL

## 2022-09-21 RX ORDER — SODIUM CHLORIDE 9 MG/ML
75 INJECTION, SOLUTION INTRAVENOUS CONTINUOUS
Status: DISCONTINUED | OUTPATIENT
Start: 2022-09-21 | End: 2022-09-21 | Stop reason: HOSPADM

## 2022-09-21 RX ORDER — FENTANYL CITRATE 50 UG/ML
INJECTION, SOLUTION INTRAMUSCULAR; INTRAVENOUS AS NEEDED
Status: DISCONTINUED | OUTPATIENT
Start: 2022-09-21 | End: 2022-09-21 | Stop reason: HOSPADM

## 2022-09-21 RX ORDER — CEFAZOLIN SODIUM 1 G/50ML
INJECTION, SOLUTION INTRAVENOUS CONTINUOUS PRN
Status: COMPLETED | OUTPATIENT
Start: 2022-09-21 | End: 2022-09-21

## 2022-09-21 RX ORDER — SODIUM CHLORIDE 0.9 % (FLUSH) 0.9 %
10 SYRINGE (ML) INJECTION AS NEEDED
Status: DISCONTINUED | OUTPATIENT
Start: 2022-09-21 | End: 2022-09-21 | Stop reason: HOSPADM

## 2022-09-21 RX ORDER — MIDAZOLAM HYDROCHLORIDE 1 MG/ML
INJECTION INTRAMUSCULAR; INTRAVENOUS AS NEEDED
Status: DISCONTINUED | OUTPATIENT
Start: 2022-09-21 | End: 2022-09-21 | Stop reason: HOSPADM

## 2022-09-21 RX ORDER — LIDOCAINE HYDROCHLORIDE 10 MG/ML
INJECTION, SOLUTION INFILTRATION; PERINEURAL AS NEEDED
Status: DISCONTINUED | OUTPATIENT
Start: 2022-09-21 | End: 2022-09-21 | Stop reason: HOSPADM

## 2022-09-21 RX ORDER — SODIUM CHLORIDE 0.9 % (FLUSH) 0.9 %
10 SYRINGE (ML) INJECTION EVERY 12 HOURS SCHEDULED
Status: DISCONTINUED | OUTPATIENT
Start: 2022-09-21 | End: 2022-09-21 | Stop reason: HOSPADM

## 2022-09-21 RX ADMIN — SODIUM CHLORIDE 75 ML/HR: 9 INJECTION, SOLUTION INTRAVENOUS at 10:28

## 2022-09-21 NOTE — H&P
[facility]   HISTORY AND PHYSICAL    Patient Name:Kingston Hope  : 1970  MRN: [id number]  Primary Care Physician: Kota, No Known  Date of admission: 2022    Subjective   Subjective     Chief Complaint: Syncope,  Dual Chamber Pacemaker at [facility]    History of Present Illness   Kingston Hope is a 52 y.o. male who had a dual chamber pacemaker implanted for syncope.  First pacemaker was implanted in .  He has never had syncope since implant.     He has no complaints related to the pacemaker    Review of Systems   Constitutional: Negative for activity change and fatigue.   Eyes: Negative.    Respiratory: Negative for chest tightness and shortness of breath.    Cardiovascular: Negative for chest pain, palpitations and leg swelling.   Gastrointestinal: Negative.    Endocrine: Negative.    Genitourinary: Negative.    Musculoskeletal: Negative.    Skin: Negative.    Neurological: Negative for dizziness and syncope.   Hematological: Negative.    Psychiatric/Behavioral: Negative.          Personal History     Past Medical History:   Diagnosis Date   • Anxiety    • Exertional chest pain     with radiation to left arm.   • History of non-A, non-B viral hepatitis     during childhood.   • History of Trevor Mountain spotted fever     as a child   • Pacemaker     secondary to intermittent heart block/ asystole.   • Palpitations    • Syncope        Past Surgical History:   Procedure Laterality Date   • CHOLECYSTECTOMY     • OTHER SURGICAL HISTORY      left femur fracture   • PACEMAKER IMPLANTATION  10/12/2010    medtronic  model number [id number], serial number [id number]   • TEMPOROMANDIBULAR JOINT SURGERY         Family History: His family history is not on file.     Social History: He  reports that he has never smoked. He has never used smokeless tobacco. He reports that he does not drink alcohol and does not use drugs.    Home Medications:  Desvenlafaxine Succinate and ibuprofen    Allergies:  He  is allergic to morphine and meloxicam.    Objective    Objective     Vitals:    Temp:  [97.7 °F (36.5 °C)] 97.7 °F (36.5 °C)  Heart Rate:  [68-76] 76  Resp:  [12-20] 18  BP: (110-147)/() 135/100    Physical Exam  Vitals and nursing note reviewed.   Constitutional:       General: He is not in acute distress.     Appearance: He is not diaphoretic.   HENT:      Mouth/Throat:      Pharynx: No oropharyngeal exudate.   Eyes:      General: No scleral icterus.  Neck:      Trachea: No tracheal deviation.   Cardiovascular:      Rate and Rhythm: Normal rate and regular rhythm.      Comments: Pacemaker in place on left chest.  Pocket is normal in appearance. There are two prior horizontal incisions over the pacemaker    Pulmonary:      Effort: Pulmonary effort is normal. No respiratory distress.      Breath sounds: Normal breath sounds.   Abdominal:      General: There is no distension.      Palpations: Abdomen is soft.   Skin:     General: Skin is warm and dry.   Neurological:      Mental Status: He is alert and oriented to person, place, and time.   Psychiatric:         Behavior: Behavior normal.         Thought Content: Thought content normal.         Judgment: Judgment normal.          Result Review    Result Review:  I have personally reviewed the results from the time of this admission to 9/21/2022 19:07 EDT and agree with these findings:  []  Laboratory list / accordion  []  Microbiology  []  Radiology  []  EKG/Telemetry   []  Cardiology/Vascular   []  Pathology  []  Old records  []  Other:  Most notable findings include: sinus rhythm      Assessment & Plan   Assessment / Plan     Brief Patient Summary:  Kingston Hope is a 52 y.o. male with history of syncope, possibly vasovagal syncope.  No recurrence after dual chamber pacemaker implant.  We have discussed the benefits vs risk (infection, bleeding)  We have decided to proceed.     Active Hospital Problems:  Active Hospital Problems    Diagnosis    •  **Syncope      Plan:   Proceed with dual chamber pacemaker replacement.       Freedom Meeks MD

## 2022-09-21 NOTE — DISCHARGE INSTRUCTIONS
"[facility]   [phone]    Post Generator Change Instructions      1.  The dressing may be removed the next day.    2. If steri-strips were used, they should not be removed. Allow them to \"fall off\".  The surgical glue will flake off on its own over time, do not pick off.    3. You may shower after the dressing is removed. Do not allow shower water to hit directly on incision.    4. No lotion/powder/ointment/cream on incision until it is healed.    5. Gently wash incision daily with soap and water and pat dry.    6. You may reapply a dressing if there is drainage, otherwise leave your incision open to air. If you reapply a dressing, please notify the pacemaker clinic.    7. No heavy lifting, pulling, or pushing until follow up appointment.    8. The pacemaker clinic will contact you (usually within 1 business day) to schedule a pacemaker/incision check. The check is usually done 7-10 days post-implant. If you have not heard from the pacemaker clinic within 3 days, please call the office.    9. Please call the office if you experience any of the following:   bleeding or drainage from your incision   swelling, redness, or opening of your incision   fever or chills   pain not relieved with medication   chest pain or difficulty breathing   lightheadness    10. For defibrillator patients only: If you receive a shock from your device, please call the office. If you receive 2 or more shocks within a 24 hour period OR if you receive 1 shock and feel poorly, you should be evaluated in the emergency room. Please DO NOT DRIVE if you have received a shock until your device has been checked.   "

## 2022-09-30 ENCOUNTER — CLINICAL SUPPORT NO REQUIREMENTS (OUTPATIENT)
Dept: CARDIOLOGY | Facility: CLINIC | Age: 52
End: 2022-09-30

## 2022-09-30 DIAGNOSIS — I49.5 SICK SINUS SYNDROME: Primary | ICD-10-CM

## 2022-09-30 PROCEDURE — 93280 PM DEVICE PROGR EVAL DUAL: CPT | Performed by: INTERNAL MEDICINE

## 2023-01-03 ENCOUNTER — OFFICE VISIT (OUTPATIENT)
Dept: CARDIOLOGY | Facility: CLINIC | Age: 53
End: 2023-01-03
Payer: COMMERCIAL

## 2023-01-03 ENCOUNTER — CLINICAL SUPPORT NO REQUIREMENTS (OUTPATIENT)
Dept: CARDIOLOGY | Facility: CLINIC | Age: 53
End: 2023-01-03
Payer: COMMERCIAL

## 2023-01-03 VITALS — HEIGHT: 70 IN | BODY MASS INDEX: 34.65 KG/M2 | WEIGHT: 242 LBS

## 2023-01-03 DIAGNOSIS — R55 SYNCOPE, UNSPECIFIED SYNCOPE TYPE: Primary | ICD-10-CM

## 2023-01-03 DIAGNOSIS — I49.5 SICK SINUS SYNDROME: Primary | ICD-10-CM

## 2023-01-03 DIAGNOSIS — Z45.018 PACEMAKER REPROGRAMMING/CHECK: ICD-10-CM

## 2023-01-03 PROCEDURE — 93000 ELECTROCARDIOGRAM COMPLETE: CPT

## 2023-01-03 PROCEDURE — 93280 PM DEVICE PROGR EVAL DUAL: CPT | Performed by: INTERNAL MEDICINE

## 2023-01-03 PROCEDURE — 99213 OFFICE O/P EST LOW 20 MIN: CPT

## 2023-01-03 NOTE — PROGRESS NOTES
Date of Office Visit: 2023  Encounter Provider: SHELDON Reynaga  Place of Service: [facility]  Patient Name: Kingston Hope  :1970    Chief Complaint   Patient presents with   • SSS   • Syncope   • Pacemaker Check   :     HPI: Kingston Hope is a 52 y.o. male who is followed by Dr. Meeks. He has a history of recurrent syncope and had a pacemaker placed almost 20 years ago. He underwent generator change with Dr. Meeks in September.       He presents today for follow up appt.        He says that he is doing well.     Has not had any issues since his device was placed. No concerns about generator change.     Incision is well healed. He does have some nerve pain from the scar tissue at times. No signs of infection or hematoma.     Normal device testing and function in office today.               Past Medical History:   Diagnosis Date   • Anxiety    • Exertional chest pain     with radiation to left arm.   • History of non-A, non-B viral hepatitis     during childhood.   • History of Trevor Mountain spotted fever     as a child   • Pacemaker     secondary to intermittent heart block/ asystole.   • Palpitations    • Syncope        Past Surgical History:   Procedure Laterality Date   • CARDIAC ELECTROPHYSIOLOGY PROCEDURE N/A 2022    Procedure: PPM generator change - dual Abbott;  Surgeon: Freedom Meeks MD;  Location: [facility] INVASIVE LOCATION;  Service: Cardiology;  Laterality: N/A;   • CHOLECYSTECTOMY     • OTHER SURGICAL HISTORY      left femur fracture   • PACEMAKER IMPLANTATION  10/12/2010    medtronic  model number [id number], serial number [id number]   • TEMPOROMANDIBULAR JOINT SURGERY         Social History     Socioeconomic History   • Marital status:    Tobacco Use   • Smoking status: Never   • Smokeless tobacco: Never   Vaping Use   • Vaping Use: Never used   Substance and Sexual Activity   • Alcohol use: No   • Drug use:  Never   • Sexual activity: Defer       No family history on file.    Review of Systems   Constitutional: Negative for chills, fever and malaise/fatigue.   Cardiovascular: Negative for chest pain, dyspnea on exertion, leg swelling, near-syncope, orthopnea, palpitations, paroxysmal nocturnal dyspnea and syncope.   Respiratory: Negative for cough and shortness of breath.    Hematologic/Lymphatic: Negative.    Musculoskeletal: Negative for joint pain, joint swelling and myalgias.   Gastrointestinal: Negative for abdominal pain, diarrhea, melena, nausea and vomiting.   Genitourinary: Negative for frequency and hematuria.   Neurological: Negative for light-headedness, numbness, paresthesias and seizures.   Allergic/Immunologic: Negative.    All other systems reviewed and are negative.      Allergies   Allergen Reactions   • Morphine Nausea Only   • Meloxicam Rash         Current Outpatient Medications:   •  Desvenlafaxine Succinate (PRISTIQ PO), Take 25 mg by mouth., Disp: , Rfl:   •  ibuprofen (ADVIL,MOTRIN) 200 MG tablet, Take  by mouth., Disp: , Rfl:       Objective:     Vitals:    01/03/23 1342   Weight: 110 kg (242 lb)   Height: 177.8 cm (70\")     Body mass index is 34.72 kg/m².    PHYSICAL EXAM:    Vitals Reviewed.   General Appearance: No acute distress, well developed and well nourished.   Eyes: Conjunctiva and lids: No erythema, swelling, or discharge. Sclera non-icteric.   HENT: Atraumatic, normocephalic. External eyes, ears, and nose normal.   Respiratory: No signs of respiratory distress. Respiration rhythm and depth normal.   Clear to auscultation. No rales, crackles, rhonchi, or wheezing auscultated.   Cardiovascular:  Heart Rate and Rhythm: Normal, Heart Sounds: Normal S1 and S2. No S3 or S4 noted.  Gastrointestinal:  Abdomen soft, non-distended, non-tender.   Musculoskeletal: Normal movement of extremities  Skin: Warm and dry.   Psychiatric: Patient alert and oriented to person, place, and time. Speech and  behavior appropriate. Normal mood and affect.       ECG 12 Lead    Date/Time: 1/3/2023 3:12 PM  Performed by: Mt Garcia APRN  Authorized by: Mt Garcia APRN   Comparison: compared with previous ECG   Similar to previous ECG  Rhythm: sinus rhythm  BPM: 72                Assessment:       Diagnosis Plan   1. Syncope, unspecified syncope type        2. Pacemaker reprogramming/check               Plan:   1-2. Syncope--s/p PPM (gen change 9/21/2022)--- he had pacemaker placed 20 years ago for recurrent syncope. He has done very well and had no recurrence of syncope since device was placed.  Normal testing and function in office today, he only paces 1% of the time.       He will follow up in one year or sooner if he has issues.           As always, it has been a pleasure to participate in your patient's care.      Sincerely,         SHELDON Reynaga

## 2024-01-04 ENCOUNTER — CLINICAL SUPPORT NO REQUIREMENTS (OUTPATIENT)
Age: 54
End: 2024-01-04
Payer: COMMERCIAL

## 2024-01-04 ENCOUNTER — OFFICE VISIT (OUTPATIENT)
Age: 54
End: 2024-01-04
Payer: COMMERCIAL

## 2024-01-04 VITALS
BODY MASS INDEX: 36.08 KG/M2 | WEIGHT: 252 LBS | DIASTOLIC BLOOD PRESSURE: 64 MMHG | SYSTOLIC BLOOD PRESSURE: 106 MMHG | HEIGHT: 70 IN | HEART RATE: 77 BPM

## 2024-01-04 DIAGNOSIS — I49.5 SICK SINUS SYNDROME: Primary | ICD-10-CM

## 2024-01-04 DIAGNOSIS — R55 VASOVAGAL SYNCOPE: ICD-10-CM

## 2024-01-04 DIAGNOSIS — Z45.018 PACEMAKER REPROGRAMMING/CHECK: Primary | ICD-10-CM

## 2024-01-04 RX ORDER — DESVENLAFAXINE 25 MG/1
25 TABLET, EXTENDED RELEASE ORAL DAILY
COMMUNITY
Start: 2023-12-02 | End: 2024-01-04

## 2024-01-04 NOTE — PROGRESS NOTES
Date of Office Visit: 2024  Encounter Provider: Freedom Meeks MD  Place of Service: [facility]  Patient Name: Kingston Hope  :1970    Chief Complaint   Patient presents with    Syncope    Pacemaker Check   :     HPI: Kingston Hope is a 53 y.o. male who presents today for follow-up for dual chamber pacemaker and syncope(suspected vasovagal)    He has over 20 ppm history.  Generator change in .  No issues.  Pacing < 1 %.  No Syncope    PCP managing lipids. We don't have labs, he is going to try to connect.  BP is good.            Past Medical History:   Diagnosis Date    Anxiety     Exertional chest pain     with radiation to left arm.    History of non-A, non-B viral hepatitis     during childhood.    History of Trevor Mountain spotted fever     as a child    Pacemaker     secondary to intermittent heart block/ asystole.    Palpitations     Syncope        Past Surgical History:   Procedure Laterality Date    CARDIAC ELECTROPHYSIOLOGY PROCEDURE N/A 2022    Procedure: PPM generator change - dual Abbott;  Surgeon: Freedom Meeks MD;  Location: [facility] INVASIVE LOCATION;  Service: Cardiology;  Laterality: N/A;    CHOLECYSTECTOMY      OTHER SURGICAL HISTORY      left femur fracture    PACEMAKER IMPLANTATION  10/12/2010    medtronic  model number [id number], serial number [id number]    TEMPOROMANDIBULAR JOINT SURGERY         Social History     Socioeconomic History    Marital status:    Tobacco Use    Smoking status: Never    Smokeless tobacco: Never   Vaping Use    Vaping Use: Never used   Substance and Sexual Activity    Alcohol use: No    Drug use: Never    Sexual activity: Defer       No family history on file.    Review of Systems   Constitutional: Negative.   Cardiovascular: Negative.    Respiratory: Negative.     Gastrointestinal: Negative.        Allergies   Allergen Reactions    Morphine Nausea Only    Meloxicam Rash  "        Current Outpatient Medications:     Desvenlafaxine Succinate (PRISTIQ PO), Take 25 mg by mouth., Disp: , Rfl:       Objective:     Vitals:    01/04/24 0910   BP: 106/64   Pulse: 77   Weight: 114 kg (252 lb)   Height: 177.8 cm (70\")     Body mass index is 36.16 kg/m².    PHYSICAL EXAM:    Vitals and nursing note reviewed.   Constitutional:       General: Not in acute distress.  Pulmonary:      Effort: Pulmonary effort is normal. No respiratory distress.   Chest:      Comments: Pacemaker incision well healed, pocket normal  Cardiovascular:      Normal rate. Regular rhythm.   Skin:     General: Skin is warm and dry.   Neurological:      Mental Status: Alert and oriented to person, place, and time.   Psychiatric:         Behavior: Behavior normal.         Thought Content: Thought content normal.         Judgment: Judgment normal.             ECG 12 Lead    Date/Time: 1/4/2024 9:46 AM  Performed by: Freedom Meeks MD    Authorized by: Freedom Meeks MD  Comparison: compared with previous ECG   Similar to previous ECG  Rhythm: sinus rhythm    Clinical impression: normal ECG            Assessment:       Diagnosis Plan   1. Pacemaker reprogramming/check        2. Vasovagal syncope               Plan:       Once year pacemaker follow-up    As always, it has been a pleasure to participate in your patient's care.      Sincerely,         Freedom Meeks MD  "

## 2024-01-10 ENCOUNTER — OFFICE VISIT (OUTPATIENT)
Dept: PODIATRY | Facility: CLINIC | Age: 54
End: 2024-01-10
Payer: COMMERCIAL

## 2024-01-10 VITALS
WEIGHT: 254 LBS | HEIGHT: 70 IN | TEMPERATURE: 97.8 F | HEART RATE: 80 BPM | OXYGEN SATURATION: 95 % | DIASTOLIC BLOOD PRESSURE: 84 MMHG | SYSTOLIC BLOOD PRESSURE: 121 MMHG | BODY MASS INDEX: 36.36 KG/M2

## 2024-01-10 DIAGNOSIS — M72.2 PLANTAR FASCIITIS: Primary | ICD-10-CM

## 2024-01-10 DIAGNOSIS — M79.671 RIGHT FOOT PAIN: ICD-10-CM

## 2024-01-10 RX ORDER — TRIAMCINOLONE ACETONIDE 40 MG/ML
20 INJECTION, SUSPENSION INTRA-ARTICULAR; INTRAMUSCULAR ONCE
Status: COMPLETED | OUTPATIENT
Start: 2024-01-10 | End: 2024-01-10

## 2024-01-10 RX ORDER — BUPIVACAINE HYDROCHLORIDE 5 MG/ML
5 INJECTION, SOLUTION PERINEURAL ONCE
Status: COMPLETED | OUTPATIENT
Start: 2024-01-10 | End: 2024-01-10

## 2024-01-10 RX ADMIN — TRIAMCINOLONE ACETONIDE 20 MG: 40 INJECTION, SUSPENSION INTRA-ARTICULAR; INTRAMUSCULAR at 08:43

## 2024-01-10 RX ADMIN — BUPIVACAINE HYDROCHLORIDE 5 ML: 5 INJECTION, SOLUTION PERINEURAL at 08:43

## 2024-01-10 NOTE — PROGRESS NOTES
[facility] - PODIATRY    Today's Date: 01/10/24    Patient Name: Kingston Hope  MRN: [id number]  CSN: [id number]  PCP: Mandy Celeste APRN,   Referring Provider: Mandy Celeste APRN    SUBJECTIVE     Chief Complaint   Patient presents with    Left Foot - Establish Care, Pain     Heel pain x 3 months   Had PT, arch supports, exercise at home     Right Foot - Establish Care, Pain     Heel pain x 3 months   Had PT, arch supports, exercise at home   Right is worse than left        HPI: Kingston Hope, a 53 y.o.male, comes to clinic.    New, Established, New Problem:     Aggravating factors:  Patient describes morning right heel pain as stabbing, burning, or aching. This pain usually subsides throughout the day, however it returns after periods of rest and sitting, when standing back up on their feet, and again the next morning.      Previous Treatment: Stretching and inserts    Patient denies any fevers, chills, nausea, vomiting, shortness of breath, nor any other constitutional signs nor symptoms.    No other pedal complaints at this time.    Past Medical History:   Diagnosis Date    Anxiety     Exertional chest pain     with radiation to left arm.    Foot pain, bilateral     Heel pain, bilateral     History of non-A, non-B viral hepatitis     during childhood.    History of Trevor [place] spotted fever     as a child    Pacemaker     secondary to intermittent heart block/ asystole.    Palpitations     Syncope      Past Surgical History:   Procedure Laterality Date    CARDIAC ELECTROPHYSIOLOGY PROCEDURE N/A 9/21/2022    Procedure: PPM generator change - dual Abbott;  Surgeon: Freedom Meeks MD;  Location: [facility] INVASIVE LOCATION;  Service: Cardiology;  Laterality: N/A;    CHOLECYSTECTOMY      OTHER SURGICAL HISTORY      left femur fracture    PACEMAKER IMPLANTATION  10/12/2010    medtronic  model number [id number], serial number [id number]    TEMPOROMANDIBULAR JOINT SURGERY        Family History   Family history unknown: Yes     Social History     Socioeconomic History    Marital status:    Tobacco Use    Smoking status: Never    Smokeless tobacco: Never   Vaping Use    Vaping Use: Never used   Substance and Sexual Activity    Alcohol use: No    Drug use: Never    Sexual activity: Defer     Allergies   Allergen Reactions    Morphine Nausea Only    Meloxicam Rash     Current Outpatient Medications   Medication Sig Dispense Refill    Desvenlafaxine Succinate (PRISTIQ PO) Take 25 mg by mouth.       No current facility-administered medications for this visit.     Review of Systems   Constitutional: Negative.    Musculoskeletal:         Right heel pain   All other systems reviewed and are negative.      OBJECTIVE     Vitals:    01/10/24 0816   BP: 121/84   Pulse: 80   Temp: 97.8 °F (36.6 °C)   SpO2: 95%       PHYSICAL EXAM     Foot/Ankle Exam    GENERAL  Appearance:  appears stated age  Orientation:  AAOx3  Affect:  appropriate  Gait:  unimpaired  Assistance:  independent    VASCULAR     Right Foot Vascularity   Normal vascular exam    Dorsalis pedis:  2+  Posterior tibial:  2+  Skin temperature:  warm  Edema grading:  None  CFT:  < 3 seconds  Pedal hair growth:  Present  Varicosities:  none     Left Foot Vascularity   Normal vascular exam    Dorsalis pedis:  2+  Posterior tibial:  2+  Skin temperature:  warm  Edema grading:  None  CFT:  < 3 seconds  Pedal hair growth:  Present  Varicosities:  none     NEUROLOGIC     Right Foot Neurologic   Normal sensation    Light touch sensation: normal  Vibratory sensation: normal  Hot/Cold sensation: normal     Left Foot Neurologic   Normal sensation    Light touch sensation: normal  Vibratory sensation: normal  Hot/Cold sensation:  normal    MUSCULOSKELETAL     Right Foot Musculoskeletal   Ecchymosis:  none  Tenderness:  plantar fascia tenderness      MUSCLE STRENGTH     Right Foot Muscle Strength   Foot dorsiflexion:  4  Foot plantar flexion:   "4  Foot inversion:  4  Foot eversion:  4     Left Foot Muscle Strength   Foot dorsiflexion:  4  Foot plantar flexion:  4  Foot inversion:  4  Foot eversion:  4    RANGE OF MOTION     Right Foot Range of Motion   Foot and ankle ROM within normal limits       Left Foot Range of Motion   Foot and ankle ROM within normal limits      DERMATOLOGIC      Right Foot Dermatologic   Skin  Right foot skin is intact.   Nails comment:  Toenails 1, 2, 3, 4, and 5     Left Foot Dermatologic   Skin  Left foot skin is intact.   Nails comment:  Toenails 1, 2, 3, 4, and 5      RADIOLOGY:    No results found.         ASSESSMENT/PLAN     Diagnoses and all orders for this visit:    1. Plantar fasciitis (Primary)    2. Right foot pain    Other orders  -     SCANNED - IMAGING      Comprehensive lower extremity examination and evaluation was performed.    Discussed findings and treatment plan including risks, benefits, and treatment options with patient in detail. Patient agreed with treatment plan.    Plantar Fasciits Injection:    Date/Time: 01/10/2024  Performed by: Duarte Maciel DPM  Authorized by: Duarte Maciel DPM     Consent: Verbal consent obtained. Written consent obtained.  Risks and benefits: risks, benefits and alternatives were discussed  Consent given by: patient  Patient identity confirmed: verbally with patient  Indications: pain relief    Injection site: Right heel    Sedation:  none    Patient position: sitting  Needle size: 27 G, 1 1/4\" in length  Injection medications: 2 ml 0.25% Marcaine plain, 1 ml Kenalog 40 mg/ml    Outcome: pain improved    Patient tolerated the procedure well with no immediate complications.    Treatment Options discussed:  Patient to begin stretching exercises and icing in the evening as tolerated.  Arch support discussed with the patient.  Anti-inflammatory medication to begin taking if okay by PCP.    Patient may begin to weight bear as tolerated in supportive shoes.  No impact " activities for to 24 to 46 hours.  After that time, the patient may increase activities as tolerated. Patient states understanding and agreement with this plan.    An After Visit Summary was printed and given to the patient at discharge, including (if requested) any available informative/educational handouts regarding diagnosis, treatment, or medications. All questions were answered to patient/family satisfaction. Should symptoms fail to improve or worsen they agree to call or return to clinic or to go to the Emergency Department. Discussed the importance of following up with any needed screening tests/labs/specialist appointments and any requested follow-up recommended by me today. Importance of maintaining follow-up discussed and patient accepts that missed appointments can delay diagnosis and potentially lead to worsening of conditions.    Return in about 1 month (around 2/10/2024)., or sooner if acute issues arise.    This document has been electronically signed by Duarte Maciel DPM on January 10, 2024 10:51 EST

## 2024-01-11 ENCOUNTER — PATIENT ROUNDING (BHMG ONLY) (OUTPATIENT)
Dept: PODIATRY | Facility: CLINIC | Age: 54
End: 2024-01-11
Payer: COMMERCIAL

## 2024-01-11 NOTE — PROGRESS NOTES
A [employer] message has been sent to the patient for PATIENT ROUNDING with [facility] [place] [place] [facility]   rolling walker

## 2024-02-08 ENCOUNTER — OFFICE VISIT (OUTPATIENT)
Dept: PODIATRY | Facility: CLINIC | Age: 54
End: 2024-02-08
Payer: COMMERCIAL

## 2024-02-08 VITALS
HEIGHT: 70 IN | HEART RATE: 83 BPM | WEIGHT: 251 LBS | TEMPERATURE: 97.5 F | SYSTOLIC BLOOD PRESSURE: 126 MMHG | OXYGEN SATURATION: 97 % | BODY MASS INDEX: 35.93 KG/M2 | DIASTOLIC BLOOD PRESSURE: 88 MMHG

## 2024-02-08 DIAGNOSIS — M72.2 PLANTAR FASCIITIS: Primary | ICD-10-CM

## 2024-02-08 DIAGNOSIS — M79.671 RIGHT FOOT PAIN: ICD-10-CM

## 2024-02-08 NOTE — PROGRESS NOTES
[facility] - PODIATRY    Today's Date: 02/08/24    Patient Name: Kingston Hope  MRN: [id number]  CSN: [id number]  PCP: Mandy Celeste APRN,   Referring Provider: No ref. provider found    SUBJECTIVE     Chief Complaint   Patient presents with    Right Foot - Follow-up, Pain, Plantar Fasciitis     Injection follow up   Feeling better      HPI: Kingston Hope, a 53 y.o.male, comes to clinic.    New, Established, New Problem:     Aggravating factors:  Patient describes morning right heel pain as stabbing, burning, or aching. This pain usually subsides throughout the day, however it returns after periods of rest and sitting, when standing back up on their feet, and again the next morning.      Previous Treatment: Stretching and inserts    Patient denies any fevers, chills, nausea, vomiting, shortness of breath, nor any other constitutional signs nor symptoms.    No other pedal complaints at this time.    2/8/2024-patient states he 65% better with the injection.  Has been continued with stretching and inserts.    Past Medical History:   Diagnosis Date    Anxiety     Exertional chest pain     with radiation to left arm.    Foot pain, bilateral     Heel pain, bilateral     History of non-A, non-B viral hepatitis     during childhood.    History of Trevor Mountain spotted fever     as a child    Pacemaker     secondary to intermittent heart block/ asystole.    Palpitations     Syncope      Past Surgical History:   Procedure Laterality Date    CARDIAC ELECTROPHYSIOLOGY PROCEDURE N/A 09/21/2022    Procedure: PPM generator change - dual Abbott;  Surgeon: Freedom Meeks MD;  Location: [facility] INVASIVE LOCATION;  Service: Cardiology;  Laterality: N/A;    CHOLECYSTECTOMY      CYST REMOVAL Right 01/24/2024    index finger at [facility]    OTHER SURGICAL HISTORY      left femur fracture    PACEMAKER IMPLANTATION  10/12/2010    medtronic  model number [id number], serial number [id number]     TEMPOROMANDIBULAR JOINT SURGERY       Family History   Family history unknown: Yes     Social History     Socioeconomic History    Marital status:    Tobacco Use    Smoking status: Never    Smokeless tobacco: Never   Vaping Use    Vaping Use: Never used   Substance and Sexual Activity    Alcohol use: No    Drug use: Never    Sexual activity: Defer     Allergies   Allergen Reactions    Morphine Nausea Only    Meloxicam Rash     Current Outpatient Medications   Medication Sig Dispense Refill    Desvenlafaxine Succinate (PRISTIQ PO) Take 25 mg by mouth.       No current facility-administered medications for this visit.     Review of Systems   Constitutional: Negative.    Musculoskeletal:         Right heel pain   All other systems reviewed and are negative.      OBJECTIVE     Vitals:    02/08/24 0836   BP: 126/88   Pulse: 83   Temp: 97.5 °F (36.4 °C)   SpO2: 97%       PHYSICAL EXAM     Foot/Ankle Exam    GENERAL  Appearance:  appears stated age  Orientation:  AAOx3  Affect:  appropriate  Gait:  unimpaired  Assistance:  independent    VASCULAR     Right Foot Vascularity   Normal vascular exam    Dorsalis pedis:  2+  Posterior tibial:  2+  Skin temperature:  warm  Edema grading:  None  CFT:  < 3 seconds  Pedal hair growth:  Present  Varicosities:  none     Left Foot Vascularity   Normal vascular exam    Dorsalis pedis:  2+  Posterior tibial:  2+  Skin temperature:  warm  Edema grading:  None  CFT:  < 3 seconds  Pedal hair growth:  Present  Varicosities:  none     NEUROLOGIC     Right Foot Neurologic   Normal sensation    Light touch sensation: normal  Vibratory sensation: normal  Hot/Cold sensation: normal     Left Foot Neurologic   Normal sensation    Light touch sensation: normal  Vibratory sensation: normal  Hot/Cold sensation:  normal    MUSCULOSKELETAL     Right Foot Musculoskeletal   Ecchymosis:  none    MUSCLE STRENGTH     Right Foot Muscle Strength   Foot dorsiflexion:  4  Foot plantar flexion:  4  Foot  inversion:  4  Foot eversion:  4     Left Foot Muscle Strength   Foot dorsiflexion:  4  Foot plantar flexion:  4  Foot inversion:  4  Foot eversion:  4    RANGE OF MOTION     Right Foot Range of Motion   Foot and ankle ROM within normal limits       Left Foot Range of Motion   Foot and ankle ROM within normal limits      DERMATOLOGIC      Right Foot Dermatologic   Skin  Right foot skin is intact.   Nails comment:  Toenails 1, 2, 3, 4, and 5     Left Foot Dermatologic   Skin  Left foot skin is intact.   Nails comment:  Toenails 1, 2, 3, 4, and 5      RADIOLOGY:    No results found.         ASSESSMENT/PLAN     Diagnoses and all orders for this visit:    1. Plantar fasciitis (Primary)    2. Right foot pain      Comprehensive lower extremity examination and evaluation was performed.    Patient improved with injection.  Discussed custom inserts and over-the-counter inserts to help support.  Discussed with patient surgical options if symptoms do not resolve over the next several months.  He has already done physical therapy.  Return to clinic in 3 to 4 months or as needed.    Discussed findings and treatment plan including risks, benefits, and treatment options with patient in detail. Patient agreed with treatment plan.      An After Visit Summary was printed and given to the patient at discharge, including (if requested) any available informative/educational handouts regarding diagnosis, treatment, or medications. All questions were answered to patient/family satisfaction. Should symptoms fail to improve or worsen they agree to call or return to clinic or to go to the Emergency Department. Discussed the importance of following up with any needed screening tests/labs/specialist appointments and any requested follow-up recommended by me today. Importance of maintaining follow-up discussed and patient accepts that missed appointments can delay diagnosis and potentially lead to worsening of conditions.    Return in about 3  months (around 5/8/2024)., or sooner if acute issues arise.    This document has been electronically signed by Duarte Maciel DPM on February 8, 2024 13:39 EST

## 2024-04-03 ENCOUNTER — OFFICE VISIT (OUTPATIENT)
Dept: PODIATRY | Facility: CLINIC | Age: 54
End: 2024-04-03
Payer: COMMERCIAL

## 2024-04-03 VITALS
HEART RATE: 94 BPM | HEIGHT: 70 IN | BODY MASS INDEX: 36.08 KG/M2 | DIASTOLIC BLOOD PRESSURE: 72 MMHG | TEMPERATURE: 97.1 F | WEIGHT: 252 LBS | SYSTOLIC BLOOD PRESSURE: 106 MMHG | OXYGEN SATURATION: 96 %

## 2024-04-03 DIAGNOSIS — M72.2 PLANTAR FASCIITIS: Primary | ICD-10-CM

## 2024-04-03 DIAGNOSIS — M79.671 RIGHT FOOT PAIN: ICD-10-CM

## 2024-04-03 RX ORDER — BUPIVACAINE HYDROCHLORIDE 5 MG/ML
5 INJECTION, SOLUTION PERINEURAL ONCE
Status: COMPLETED | OUTPATIENT
Start: 2024-04-03 | End: 2024-04-03

## 2024-04-03 RX ORDER — TRIAMCINOLONE ACETONIDE 40 MG/ML
20 INJECTION, SUSPENSION INTRA-ARTICULAR; INTRAMUSCULAR ONCE
Status: COMPLETED | OUTPATIENT
Start: 2024-04-03 | End: 2024-04-03

## 2024-04-03 RX ADMIN — BUPIVACAINE HYDROCHLORIDE 5 ML: 5 INJECTION, SOLUTION PERINEURAL at 12:59

## 2024-04-03 RX ADMIN — TRIAMCINOLONE ACETONIDE 20 MG: 40 INJECTION, SUSPENSION INTRA-ARTICULAR; INTRAMUSCULAR at 12:59

## 2024-04-03 NOTE — PROGRESS NOTES
[facility] - PODIATRY    Today's Date: 04/03/24    Patient Name: Kingston Hope  MRN: [id number]  CSN: [id number]  PCP: Mandy Celeste APRN,   Referring Provider: No ref. provider found    SUBJECTIVE     Chief Complaint   Patient presents with    Right Foot - Follow-up, Plantar Fasciitis     Requesting injection today  doing exercises  inserts     HPI: Kingston Hope, a 53 y.o.male, comes to clinic.    New, Established, New Problem:     Aggravating factors:  Patient describes morning right heel pain as stabbing, burning, or aching. This pain usually subsides throughout the day, however it returns after periods of rest and sitting, when standing back up on their feet, and again the next morning.      Previous Treatment: Stretching and inserts    Patient denies any fevers, chills, nausea, vomiting, shortness of breath, nor any other constitutional signs nor symptoms.    No other pedal complaints at this time.    Past Medical History:   Diagnosis Date    Anxiety     Exertional chest pain     with radiation to left arm.    Foot pain, bilateral     Heel pain, bilateral     History of non-A, non-B viral hepatitis     during childhood.    History of Trevor Mountain spotted fever     as a child    Pacemaker     secondary to intermittent heart block/ asystole.    Palpitations     Syncope      Past Surgical History:   Procedure Laterality Date    CARDIAC ELECTROPHYSIOLOGY PROCEDURE N/A 09/21/2022    Procedure: PPM generator change - dual Abbott;  Surgeon: Freedom Meeks MD;  Location: [facility] INVASIVE LOCATION;  Service: Cardiology;  Laterality: N/A;    CHOLECYSTECTOMY      CYST REMOVAL Right 01/24/2024    index finger at [facility]    OTHER SURGICAL HISTORY      left femur fracture    PACEMAKER IMPLANTATION  10/12/2010    medtronic  model number [id number], serial number [id number]    TEMPOROMANDIBULAR JOINT SURGERY       Family History   Family history unknown: Yes     Social History      Socioeconomic History    Marital status:    Tobacco Use    Smoking status: Never    Smokeless tobacco: Never   Vaping Use    Vaping status: Never Used   Substance and Sexual Activity    Alcohol use: No    Drug use: Never    Sexual activity: Defer     Allergies   Allergen Reactions    Morphine Nausea Only    Meloxicam Rash     Current Outpatient Medications   Medication Sig Dispense Refill    Desvenlafaxine Succinate (PRISTIQ PO) Take 25 mg by mouth.       Current Facility-Administered Medications   Medication Dose Route Frequency Provider Last Rate Last Admin    bupivacaine (MARCAINE) 0.5 % injection 5 mL  5 mL Injection Once Duarte Maciel DPM        triamcinolone acetonide (KENALOG-40) injection 20 mg  20 mg Intramuscular Once Duarte Maciel DPM         Review of Systems   Constitutional: Negative.    Musculoskeletal:         Right heel pain   All other systems reviewed and are negative.      OBJECTIVE     Vitals:    04/03/24 0923   BP: 106/72   Pulse: 94   Temp: 97.1 °F (36.2 °C)   SpO2: 96%       PHYSICAL EXAM     Foot/Ankle Exam    GENERAL  Appearance:  appears stated age  Orientation:  AAOx3  Affect:  appropriate  Gait:  unimpaired  Assistance:  independent    VASCULAR     Right Foot Vascularity   Normal vascular exam    Dorsalis pedis:  2+  Posterior tibial:  2+  Skin temperature:  warm  Edema grading:  None  CFT:  < 3 seconds  Pedal hair growth:  Present  Varicosities:  none     Left Foot Vascularity   Normal vascular exam    Dorsalis pedis:  2+  Posterior tibial:  2+  Skin temperature:  warm  Edema grading:  None  CFT:  < 3 seconds  Pedal hair growth:  Present  Varicosities:  none     NEUROLOGIC     Right Foot Neurologic   Normal sensation    Light touch sensation: normal  Vibratory sensation: normal  Hot/Cold sensation: normal     Left Foot Neurologic   Normal sensation    Light touch sensation: normal  Vibratory sensation: normal  Hot/Cold sensation:  normal    MUSCULOSKELETAL      Right Foot Musculoskeletal   Ecchymosis:  none  Tenderness:  plantar fascia tenderness      MUSCLE STRENGTH     Right Foot Muscle Strength   Foot dorsiflexion:  4  Foot plantar flexion:  4  Foot inversion:  4  Foot eversion:  4     Left Foot Muscle Strength   Foot dorsiflexion:  4  Foot plantar flexion:  4  Foot inversion:  4  Foot eversion:  4    RANGE OF MOTION     Right Foot Range of Motion   Foot and ankle ROM within normal limits       Left Foot Range of Motion   Foot and ankle ROM within normal limits      DERMATOLOGIC      Right Foot Dermatologic   Skin  Right foot skin is intact.   Nails comment:  Toenails 1, 2, 3, 4, and 5     Left Foot Dermatologic   Skin  Left foot skin is intact.   Nails comment:  Toenails 1, 2, 3, 4, and 5      RADIOLOGY:    No results found.         ASSESSMENT/PLAN     Diagnoses and all orders for this visit:    1. Plantar fasciitis (Primary)  -     bupivacaine (MARCAINE) 0.5 % injection 5 mL  -     triamcinolone acetonide (KENALOG-40) injection 20 mg  -     Ambulatory Referral For Orthotics    2. Right foot pain  -     bupivacaine (MARCAINE) 0.5 % injection 5 mL  -     triamcinolone acetonide (KENALOG-40) injection 20 mg  -     Ambulatory Referral For Orthotics      Comprehensive lower extremity examination and evaluation was performed.    Discussed findings and treatment plan including risks, benefits, and treatment options with patient in detail. Patient agreed with treatment plan.    Patient has failed physical therapy stretching and injections.  Patient is going to try custom orthotics.  Symptoms do not resolve we will discuss surgical options.    Plantar Fasciits Injection:    Date/Time: 4/3/2024  Performed by: Duarte Maciel DPM  Authorized by: Duarte Maciel DPM     Consent: Verbal consent obtained. Written consent obtained.  Risks and benefits: risks, benefits and alternatives were discussed  Consent given by: patient  Patient identity confirmed: verbally with  "patient  Indications: pain relief    Injection site: Right heel    Sedation:  none    Patient position: sitting  Needle size: 27 G, 1 1/4\" in length  Injection medications: 2 ml 0.25% Marcaine plain, 1 ml Kenalog 40 mg/ml    Outcome: pain improved    Patient tolerated the procedure well with no immediate complications.    Treatment Options discussed:  Patient to begin stretching exercises and icing in the evening as tolerated.  Arch support discussed with the patient.  Anti-inflammatory medication to begin taking if okay by PCP.    Patient may begin to weight bear as tolerated in supportive shoes.  No impact activities for to 24 to 46 hours.  After that time, the patient may increase activities as tolerated. Patient states understanding and agreement with this plan.    An After Visit Summary was printed and given to the patient at discharge, including (if requested) any available informative/educational handouts regarding diagnosis, treatment, or medications. All questions were answered to patient/family satisfaction. Should symptoms fail to improve or worsen they agree to call or return to clinic or to go to the Emergency Department. Discussed the importance of following up with any needed screening tests/labs/specialist appointments and any requested follow-up recommended by me today. Importance of maintaining follow-up discussed and patient accepts that missed appointments can delay diagnosis and potentially lead to worsening of conditions.    Return in about 1 month (around 5/3/2024)., or sooner if acute issues arise.    This document has been electronically signed by Duarte Maciel DPM on April 3, 2024 12:48 EDT    "

## 2024-05-15 ENCOUNTER — OFFICE VISIT (OUTPATIENT)
Dept: PODIATRY | Facility: CLINIC | Age: 54
End: 2024-05-15
Payer: COMMERCIAL

## 2024-05-15 VITALS
TEMPERATURE: 97.8 F | BODY MASS INDEX: 35.22 KG/M2 | WEIGHT: 246 LBS | HEART RATE: 90 BPM | HEIGHT: 70 IN | SYSTOLIC BLOOD PRESSURE: 118 MMHG | DIASTOLIC BLOOD PRESSURE: 81 MMHG | OXYGEN SATURATION: 96 %

## 2024-05-15 DIAGNOSIS — M72.2 PLANTAR FASCIITIS: ICD-10-CM

## 2024-05-15 DIAGNOSIS — M79.2 NEURITIS: Primary | ICD-10-CM

## 2024-05-15 DIAGNOSIS — M79.671 RIGHT FOOT PAIN: ICD-10-CM

## 2024-05-15 NOTE — PROGRESS NOTES
[facility] - PODIATRY    Today's Date: 05/15/24    Patient Name: Kingston Hope  MRN: [id number]  CSN: [id number]  PCP: Mandy Celeste APRN,   Referring Provider: No ref. provider found    SUBJECTIVE     Chief Complaint   Patient presents with    Right Foot - Follow-up, Pain, Plantar Fasciitis     Not getting better / wants to discuss surgery   Went to the [employer], helps a little bit not enough      HPI: Kingston Hope, a 53 y.o.male, comes to clinic.    Patient has failed physical therapy inserts and injections.  Would like to discuss surgical options for his Planter fasciitis.    Past Medical History:   Diagnosis Date    Anxiety     Exertional chest pain     with radiation to left arm.    Foot pain, bilateral     Heel pain, bilateral     History of non-A, non-B viral hepatitis     during childhood.    History of Trevor Mountain spotted fever     as a child    Pacemaker     secondary to intermittent heart block/ asystole.    Palpitations     Plantar fasciitis     Syncope      Past Surgical History:   Procedure Laterality Date    CARDIAC ELECTROPHYSIOLOGY PROCEDURE N/A 09/21/2022    Procedure: PPM generator change - dual Abbott;  Surgeon: Freedom Meeks MD;  Location: [facility] INVASIVE LOCATION;  Service: Cardiology;  Laterality: N/A;    CHOLECYSTECTOMY      CYST REMOVAL Right 01/24/2024    index finger at [facility]    OTHER SURGICAL HISTORY      left femur fracture    PACEMAKER IMPLANTATION  10/12/2010    medtronic  model number [id number], serial number [id number]    TEMPOROMANDIBULAR JOINT SURGERY       Family History   Family history unknown: Yes     Social History     Socioeconomic History    Marital status:    Tobacco Use    Smoking status: Never    Smokeless tobacco: Never   Vaping Use    Vaping status: Never Used   Substance and Sexual Activity    Alcohol use: No    Drug use: Never    Sexual activity: Defer     Allergies   Allergen Reactions    Morphine Nausea  Only    Meloxicam Rash     Current Outpatient Medications   Medication Sig Dispense Refill    Desvenlafaxine Succinate (PRISTIQ PO) Take 25 mg by mouth.       No current facility-administered medications for this visit.     Review of Systems   Constitutional: Negative.    Musculoskeletal:         Right heel pain   All other systems reviewed and are negative.      OBJECTIVE     Vitals:    05/15/24 0833   BP: 118/81   Pulse: 90   Temp: 97.8 °F (36.6 °C)   SpO2: 96%       PHYSICAL EXAM     Foot/Ankle Exam    GENERAL  Appearance:  appears stated age  Orientation:  AAOx3  Affect:  appropriate  Gait:  unimpaired  Assistance:  independent    VASCULAR     Right Foot Vascularity   Normal vascular exam    Dorsalis pedis:  2+  Posterior tibial:  2+  Skin temperature:  warm  Edema grading:  None  CFT:  < 3 seconds  Pedal hair growth:  Present  Varicosities:  none     Left Foot Vascularity   Normal vascular exam    Dorsalis pedis:  2+  Posterior tibial:  2+  Skin temperature:  warm  Edema grading:  None  CFT:  < 3 seconds  Pedal hair growth:  Present  Varicosities:  none     NEUROLOGIC     Right Foot Neurologic   Normal sensation    Light touch sensation: normal  Vibratory sensation: normal  Hot/Cold sensation: normal     Left Foot Neurologic   Normal sensation    Light touch sensation: normal  Vibratory sensation: normal  Hot/Cold sensation:  normal    MUSCULOSKELETAL     Right Foot Musculoskeletal   Ecchymosis:  none  Tenderness:  plantar fascia tenderness      MUSCLE STRENGTH     Right Foot Muscle Strength   Foot dorsiflexion:  4  Foot plantar flexion:  4  Foot inversion:  4  Foot eversion:  4     Left Foot Muscle Strength   Foot dorsiflexion:  4  Foot plantar flexion:  4  Foot inversion:  4  Foot eversion:  4    RANGE OF MOTION     Right Foot Range of Motion   Foot and ankle ROM within normal limits       Left Foot Range of Motion   Foot and ankle ROM within normal limits      DERMATOLOGIC      Right Foot Dermatologic    Skin  Right foot skin is intact.   Nails comment:  Toenails 1, 2, 3, 4, and 5     Left Foot Dermatologic   Skin  Left foot skin is intact.   Nails comment:  Toenails 1, 2, 3, 4, and 5      RADIOLOGY:    No results found.         ASSESSMENT/PLAN     Diagnoses and all orders for this visit:    1. Neuritis (Primary)  -     EMG & Nerve Conduction Test; Future    2. Plantar fasciitis  -     EMG & Nerve Conduction Test; Future    3. Right foot pain    EMG nerve conduction study ordered.    Discussed with patient if negative will proceed with plantar fasciotomy.    Comprehensive lower extremity examination and evaluation was performed.    Discussed findings and treatment plan including risks, benefits, and treatment options with patient in detail. Patient agreed with treatment plan.    An After Visit Summary was printed and given to the patient at discharge, including (if requested) any available informative/educational handouts regarding diagnosis, treatment, or medications. All questions were answered to patient/family satisfaction. Should symptoms fail to improve or worsen they agree to call or return to clinic or to go to the Emergency Department. Discussed the importance of following up with any needed screening tests/labs/specialist appointments and any requested follow-up recommended by me today. Importance of maintaining follow-up discussed and patient accepts that missed appointments can delay diagnosis and potentially lead to worsening of conditions.    No follow-ups on file., or sooner if acute issues arise.    This document has been electronically signed by Duarte Maciel DPM on May 15, 2024 11:39 EDT

## 2024-06-11 ENCOUNTER — HOSPITAL ENCOUNTER (OUTPATIENT)
Facility: HOSPITAL | Age: 54
Discharge: HOME | End: 2024-06-11
Admitting: PODIATRIST
Payer: COMMERCIAL

## 2024-06-11 DIAGNOSIS — M72.2 PLANTAR FASCIITIS: ICD-10-CM

## 2024-06-11 DIAGNOSIS — M79.2 NEURITIS: ICD-10-CM

## 2024-06-11 PROCEDURE — 95908 NRV CNDJ TST 3-4 STUDIES: CPT

## 2024-06-11 PROCEDURE — 95886 MUSC TEST DONE W/N TEST COMP: CPT

## 2025-01-07 ENCOUNTER — CLINICAL SUPPORT NO REQUIREMENTS (OUTPATIENT)
Age: 55
End: 2025-01-07
Payer: COMMERCIAL

## 2025-01-07 ENCOUNTER — OFFICE VISIT (OUTPATIENT)
Age: 55
End: 2025-01-07
Payer: COMMERCIAL

## 2025-01-07 VITALS
BODY MASS INDEX: 36.65 KG/M2 | SYSTOLIC BLOOD PRESSURE: 126 MMHG | HEIGHT: 70 IN | DIASTOLIC BLOOD PRESSURE: 82 MMHG | WEIGHT: 256 LBS | HEART RATE: 78 BPM

## 2025-01-07 DIAGNOSIS — I49.5 SICK SINUS SYNDROME: Primary | ICD-10-CM

## 2025-01-07 DIAGNOSIS — R55 VASOVAGAL SYNCOPE: Primary | ICD-10-CM

## 2025-01-07 DIAGNOSIS — Z45.018 PACEMAKER REPROGRAMMING/CHECK: ICD-10-CM

## 2025-01-07 PROCEDURE — 99214 OFFICE O/P EST MOD 30 MIN: CPT

## 2025-01-07 PROCEDURE — 93000 ELECTROCARDIOGRAM COMPLETE: CPT

## 2025-01-07 NOTE — PROGRESS NOTES
Date of Office Visit: 2025  Encounter Provider: SHELDON Reynaga  Place of Service: [facility] CARDIOLOGY  Patient Name: Kingston Hope  :1970    Chief Complaint   Patient presents with    SSS    Syncope    Pacemaker Check   :     HPI: Kingston Hope is a 54 y.o. male who follows with Dr. Meeks. He has a history of vasovagal syncope with pauses---s/p DC PPM (>30 years ago, gen change in --[place]).     He had a pacemaker placed ~30 years ago for vasovagal syncope with pauses.     He had not had any further syncope since pacemaker placement.              Presents today for follow-up appointment.    Since pacemaker placement.  He has been doing well.    He has not had any episodes of syncope since it was placed.    Normal device testing and function in office today.  AP: 1%, : 1%.  No events on device    He had a generator change in 2022 with Dr. Meeks.     Incision well-healed.    Normal lead testing and function.  Past Medical History:   Diagnosis Date    Anxiety     Exertional chest pain     with radiation to left arm.    Foot pain, bilateral     Heel pain, bilateral     History of non-A, non-B viral hepatitis     during childhood.    History of Trevor Mountain spotted fever     as a child    Pacemaker     secondary to intermittent heart block/ asystole.    Palpitations     Plantar fasciitis     Syncope        Past Surgical History:   Procedure Laterality Date    CARDIAC ELECTROPHYSIOLOGY PROCEDURE N/A 2022    Procedure: PPM generator change - dual Abbott;  Surgeon: Freedom Meeks MD;  Location: [facility] INVASIVE LOCATION;  Service: Cardiology;  Laterality: N/A;    CHOLECYSTECTOMY      CYST REMOVAL Right 2024    index finger at [facility]    OTHER SURGICAL HISTORY      left femur fracture    PACEMAKER IMPLANTATION  10/12/2010    medtronic  model number [id number], serial number [id number]    TEMPOROMANDIBULAR JOINT SURGERY    "      Social History     Socioeconomic History    Marital status:    Tobacco Use    Smoking status: Never    Smokeless tobacco: Never   Vaping Use    Vaping status: Never Used   Substance and Sexual Activity    Alcohol use: No    Drug use: Never    Sexual activity: Defer       Family History   Family history unknown: Yes       Review of Systems   Constitutional: Negative for chills, fever and malaise/fatigue.   Cardiovascular:  Negative for chest pain, dyspnea on exertion, leg swelling, near-syncope, orthopnea, palpitations, paroxysmal nocturnal dyspnea and syncope.   Respiratory:  Negative for cough and shortness of breath.    Hematologic/Lymphatic: Negative.    Musculoskeletal:  Negative for joint pain, joint swelling and myalgias.   Gastrointestinal:  Negative for abdominal pain, diarrhea, melena, nausea and vomiting.   Genitourinary:  Negative for frequency and hematuria.   Neurological:  Negative for light-headedness, numbness, paresthesias and seizures.   Allergic/Immunologic: Negative.    All other systems reviewed and are negative.      Allergies   Allergen Reactions    Morphine Nausea Only    Meloxicam Rash       No current outpatient medications on file.      Objective:     Vitals:    01/07/25 1008   BP: 126/82   BP Location: Left arm   Patient Position: Sitting   Pulse: 78   Weight: 116 kg (256 lb)   Height: 177.8 cm (70\")     Body mass index is 36.73 kg/m².    PHYSICAL EXAM:    Vitals Reviewed.   General Appearance: No acute distress, well developed and well nourished.   Eyes: Conjunctiva and lids: No erythema, swelling, or discharge. Sclera non-icteric.   HENT: Atraumatic, normocephalic. External eyes, ears, and nose normal.   Respiratory: No signs of respiratory distress. Respiration rhythm and depth normal.   Clear to auscultation. No rales, crackles, rhonchi, or wheezing auscultated.   Cardiovascular:  Heart Rate and Rhythm: Normal, Heart Sounds: Normal S1 and S2. No S3 or S4 " noted.  Gastrointestinal:  Abdomen soft, non-distended, non-tender.   Musculoskeletal: Normal movement of extremities  Skin: Warm and dry.   Psychiatric: Patient alert and oriented to person, place, and time. Speech and behavior appropriate. Normal mood and affect.       ECG 12 Lead    Date/Time: 1/7/2025 11:12 AM  Performed by: Mt Garcia APRN    Authorized by: Mt Garcia APRN  Comparison: compared with previous ECG   Similar to previous ECG  Rhythm: sinus rhythm            Assessment:       Diagnosis Plan   1. Vasovagal syncope        2. Pacemaker reprogramming/check               Plan:   1-2. Pauses, vasovagal----s/p DC PPM-----he is doing well from pacemaker standpoint.  Normal device testing and function in office today.  AP: 1%, : 1%.  No events on device.  His lead testing and function is normal.    Follow-up in 1 year with device check    As always, it has been a pleasure to participate in your patient's care.      Sincerely,         SHELDON Reynaga

## 2025-07-08 LAB
MDC_IDC_MSMT_BATTERY_REMAINING_LONGEVITY: 96 MO
MDC_IDC_MSMT_BATTERY_REMAINING_PERCENTAGE: 76 %
MDC_IDC_MSMT_BATTERY_RRT_TRIGGER: 2.6
MDC_IDC_MSMT_BATTERY_STATUS: NORMAL
MDC_IDC_MSMT_BATTERY_VOLTAGE: 3.01
MDC_IDC_MSMT_LEADCHNL_RA_DTM: NORMAL
MDC_IDC_MSMT_LEADCHNL_RA_IMPEDANCE_VALUE: 330
MDC_IDC_MSMT_LEADCHNL_RA_PACING_THRESHOLD_AMPLITUDE: 0.62
MDC_IDC_MSMT_LEADCHNL_RA_PACING_THRESHOLD_POLARITY: NORMAL
MDC_IDC_MSMT_LEADCHNL_RA_PACING_THRESHOLD_PULSEWIDTH: 0.4
MDC_IDC_MSMT_LEADCHNL_RA_SENSING_INTR_AMPL: 1.9
MDC_IDC_MSMT_LEADCHNL_RV_DTM: NORMAL
MDC_IDC_MSMT_LEADCHNL_RV_IMPEDANCE_VALUE: 610
MDC_IDC_MSMT_LEADCHNL_RV_PACING_THRESHOLD_AMPLITUDE: 1
MDC_IDC_MSMT_LEADCHNL_RV_PACING_THRESHOLD_POLARITY: NORMAL
MDC_IDC_MSMT_LEADCHNL_RV_PACING_THRESHOLD_PULSEWIDTH: 0.4
MDC_IDC_MSMT_LEADCHNL_RV_SENSING_INTR_AMPL: 5.1
MDC_IDC_PG_IMPLANT_DTM: NORMAL
MDC_IDC_PG_MFG: NORMAL
MDC_IDC_PG_MODEL: NORMAL
MDC_IDC_PG_SERIAL: NORMAL
MDC_IDC_PG_TYPE: NORMAL
MDC_IDC_SESS_DTM: NORMAL
MDC_IDC_SESS_TYPE: NORMAL
MDC_IDC_SET_BRADY_AT_MODE_SWITCH_RATE: 180
MDC_IDC_SET_BRADY_LOWRATE: 60
MDC_IDC_SET_BRADY_MAX_SENSOR_RATE: 130
MDC_IDC_SET_BRADY_MAX_TRACKING_RATE: 130
MDC_IDC_SET_BRADY_MODE: NORMAL
MDC_IDC_SET_BRADY_PAV_DELAY: 225
MDC_IDC_SET_BRADY_SAV_DELAY: 200
MDC_IDC_SET_LEADCHNL_RA_PACING_AMPLITUDE: 1.62
MDC_IDC_SET_LEADCHNL_RA_PACING_POLARITY: NORMAL
MDC_IDC_SET_LEADCHNL_RA_PACING_PULSEWIDTH: 0.4
MDC_IDC_SET_LEADCHNL_RA_SENSING_POLARITY: NORMAL
MDC_IDC_SET_LEADCHNL_RA_SENSING_SENSITIVITY: 1
MDC_IDC_SET_LEADCHNL_RV_PACING_AMPLITUDE: 1.25
MDC_IDC_SET_LEADCHNL_RV_PACING_POLARITY: NORMAL
MDC_IDC_SET_LEADCHNL_RV_PACING_PULSEWIDTH: 0.4
MDC_IDC_SET_LEADCHNL_RV_SENSING_POLARITY: NORMAL
MDC_IDC_SET_LEADCHNL_RV_SENSING_SENSITIVITY: 2
MDC_IDC_STAT_AT_BURDEN_PERCENT: 1
MDC_IDC_STAT_BRADY_RA_PERCENT_PACED: 1
MDC_IDC_STAT_BRADY_RV_PERCENT_PACED: 1

## (undated) DEVICE — THE PHOTONBLADE WITH ADAPTIVE SMOKE EVACUATION IS A MONOPOLAR RF DEVICE COUPLED WITH ILLUMINATION THAT IS INDICATED FOR CUTTING AND COAGULATION OF TISSUE DURING GENERAL SURGICAL PROCEDURES AND FOR REMOVING SMOKE GENERATED BY ELECTROSURGERY WHEN USED IN CONJUNCTION WITH AN EFFECTIVE SMOKE EVACUATION SYSTEM.: Brand: PHOTONBLADE WITH ADAPTIVE SMOKE EVACUATION

## (undated) DEVICE — ADHS SKIN SURG TISS VISC PREMIERPRO EXOFIN HI/VISC FAST/DRY

## (undated) DEVICE — LOU PACE DEFIB: Brand: MEDLINE INDUSTRIES, INC.